# Patient Record
Sex: FEMALE | Race: WHITE | NOT HISPANIC OR LATINO | URBAN - METROPOLITAN AREA
[De-identification: names, ages, dates, MRNs, and addresses within clinical notes are randomized per-mention and may not be internally consistent; named-entity substitution may affect disease eponyms.]

---

## 2022-05-15 ENCOUNTER — INPATIENT (INPATIENT)
Facility: HOSPITAL | Age: 27
LOS: 2 days | Discharge: HOME | DRG: 386 | End: 2022-05-18
Attending: STUDENT IN AN ORGANIZED HEALTH CARE EDUCATION/TRAINING PROGRAM | Admitting: STUDENT IN AN ORGANIZED HEALTH CARE EDUCATION/TRAINING PROGRAM
Payer: COMMERCIAL

## 2022-05-15 VITALS
OXYGEN SATURATION: 99 % | SYSTOLIC BLOOD PRESSURE: 127 MMHG | HEART RATE: 132 BPM | WEIGHT: 119.05 LBS | DIASTOLIC BLOOD PRESSURE: 83 MMHG | RESPIRATION RATE: 19 BRPM | TEMPERATURE: 100 F

## 2022-05-15 LAB
ALBUMIN SERPL ELPH-MCNC: 3.9 G/DL — SIGNIFICANT CHANGE UP (ref 3.4–5)
ALP SERPL-CCNC: 67 U/L — SIGNIFICANT CHANGE UP (ref 40–120)
ALT FLD-CCNC: 9 U/L — LOW (ref 12–42)
ANION GAP SERPL CALC-SCNC: 11 MMOL/L — SIGNIFICANT CHANGE UP (ref 9–16)
APTT BLD: 33.3 SEC — SIGNIFICANT CHANGE UP (ref 27.5–35.5)
AST SERPL-CCNC: 5 U/L — LOW (ref 15–37)
BASOPHILS # BLD AUTO: 0.08 K/UL — SIGNIFICANT CHANGE UP (ref 0–0.2)
BASOPHILS NFR BLD AUTO: 0.7 % — SIGNIFICANT CHANGE UP (ref 0–2)
BILIRUB SERPL-MCNC: 0.7 MG/DL — SIGNIFICANT CHANGE UP (ref 0.2–1.2)
BUN SERPL-MCNC: 3 MG/DL — LOW (ref 7–23)
CALCIUM SERPL-MCNC: 9.4 MG/DL — SIGNIFICANT CHANGE UP (ref 8.5–10.5)
CHLORIDE SERPL-SCNC: 100 MMOL/L — SIGNIFICANT CHANGE UP (ref 96–108)
CO2 SERPL-SCNC: 25 MMOL/L — SIGNIFICANT CHANGE UP (ref 22–31)
CREAT SERPL-MCNC: 0.85 MG/DL — SIGNIFICANT CHANGE UP (ref 0.5–1.3)
EGFR: 96 ML/MIN/1.73M2 — SIGNIFICANT CHANGE UP
EOSINOPHIL # BLD AUTO: 0.36 K/UL — SIGNIFICANT CHANGE UP (ref 0–0.5)
EOSINOPHIL NFR BLD AUTO: 3.2 % — SIGNIFICANT CHANGE UP (ref 0–6)
GLUCOSE SERPL-MCNC: 80 MG/DL — SIGNIFICANT CHANGE UP (ref 70–99)
HCG SERPL-ACNC: <1 MIU/ML — SIGNIFICANT CHANGE UP
HCT VFR BLD CALC: 38.6 % — SIGNIFICANT CHANGE UP (ref 34.5–45)
HGB BLD-MCNC: 13.8 G/DL — SIGNIFICANT CHANGE UP (ref 11.5–15.5)
IMM GRANULOCYTES NFR BLD AUTO: 0.5 % — SIGNIFICANT CHANGE UP (ref 0–1.5)
INR BLD: 1.06 — SIGNIFICANT CHANGE UP (ref 0.88–1.16)
LACTATE SERPL-SCNC: 0.7 MMOL/L — SIGNIFICANT CHANGE UP (ref 0.4–2)
LIDOCAIN IGE QN: 71 U/L — LOW (ref 73–393)
LYMPHOCYTES # BLD AUTO: 1.98 K/UL — SIGNIFICANT CHANGE UP (ref 1–3.3)
LYMPHOCYTES # BLD AUTO: 17.4 % — SIGNIFICANT CHANGE UP (ref 13–44)
MCHC RBC-ENTMCNC: 33.7 PG — SIGNIFICANT CHANGE UP (ref 27–34)
MCHC RBC-ENTMCNC: 35.8 GM/DL — SIGNIFICANT CHANGE UP (ref 32–36)
MCV RBC AUTO: 94.4 FL — SIGNIFICANT CHANGE UP (ref 80–100)
MONOCYTES # BLD AUTO: 0.99 K/UL — HIGH (ref 0–0.9)
MONOCYTES NFR BLD AUTO: 8.7 % — SIGNIFICANT CHANGE UP (ref 2–14)
NEUTROPHILS # BLD AUTO: 7.89 K/UL — HIGH (ref 1.8–7.4)
NEUTROPHILS NFR BLD AUTO: 69.5 % — SIGNIFICANT CHANGE UP (ref 43–77)
NRBC # BLD: 0 /100 WBCS — SIGNIFICANT CHANGE UP (ref 0–0)
PLATELET # BLD AUTO: 227 K/UL — SIGNIFICANT CHANGE UP (ref 150–400)
POTASSIUM SERPL-MCNC: 3.2 MMOL/L — LOW (ref 3.5–5.3)
POTASSIUM SERPL-SCNC: 3.2 MMOL/L — LOW (ref 3.5–5.3)
PROT SERPL-MCNC: 7.5 G/DL — SIGNIFICANT CHANGE UP (ref 6.4–8.2)
PROTHROM AB SERPL-ACNC: 12.3 SEC — SIGNIFICANT CHANGE UP (ref 10.5–13.4)
RBC # BLD: 4.09 M/UL — SIGNIFICANT CHANGE UP (ref 3.8–5.2)
RBC # FLD: 11.5 % — SIGNIFICANT CHANGE UP (ref 10.3–14.5)
SARS-COV-2 RNA SPEC QL NAA+PROBE: SIGNIFICANT CHANGE UP
SODIUM SERPL-SCNC: 136 MMOL/L — SIGNIFICANT CHANGE UP (ref 132–145)
WBC # BLD: 11.36 K/UL — HIGH (ref 3.8–10.5)
WBC # FLD AUTO: 11.36 K/UL — HIGH (ref 3.8–10.5)

## 2022-05-15 PROCEDURE — 74177 CT ABD & PELVIS W/CONTRAST: CPT | Mod: 26

## 2022-05-15 PROCEDURE — 99223 1ST HOSP IP/OBS HIGH 75: CPT | Mod: GC

## 2022-05-15 PROCEDURE — 99285 EMERGENCY DEPT VISIT HI MDM: CPT

## 2022-05-15 RX ORDER — DIATRIZOATE MEGLUMINE 180 MG/ML
30 INJECTION, SOLUTION INTRAVESICAL ONCE
Refills: 0 | Status: COMPLETED | OUTPATIENT
Start: 2022-05-15 | End: 2022-05-15

## 2022-05-15 RX ORDER — ACETAMINOPHEN 500 MG
975 TABLET ORAL ONCE
Refills: 0 | Status: COMPLETED | OUTPATIENT
Start: 2022-05-15 | End: 2022-05-15

## 2022-05-15 RX ORDER — POTASSIUM CHLORIDE 20 MEQ
40 PACKET (EA) ORAL ONCE
Refills: 0 | Status: COMPLETED | OUTPATIENT
Start: 2022-05-15 | End: 2022-05-15

## 2022-05-15 RX ORDER — METRONIDAZOLE 500 MG
500 TABLET ORAL ONCE
Refills: 0 | Status: COMPLETED | OUTPATIENT
Start: 2022-05-15 | End: 2022-05-15

## 2022-05-15 RX ORDER — ONDANSETRON 8 MG/1
4 TABLET, FILM COATED ORAL ONCE
Refills: 0 | Status: COMPLETED | OUTPATIENT
Start: 2022-05-15 | End: 2022-05-15

## 2022-05-15 RX ORDER — SODIUM CHLORIDE 9 MG/ML
1650 INJECTION INTRAMUSCULAR; INTRAVENOUS; SUBCUTANEOUS ONCE
Refills: 0 | Status: COMPLETED | OUTPATIENT
Start: 2022-05-15 | End: 2022-05-15

## 2022-05-15 RX ORDER — CEFTRIAXONE 500 MG/1
1000 INJECTION, POWDER, FOR SOLUTION INTRAMUSCULAR; INTRAVENOUS ONCE
Refills: 0 | Status: COMPLETED | OUTPATIENT
Start: 2022-05-15 | End: 2022-05-15

## 2022-05-15 RX ORDER — SODIUM CHLORIDE 9 MG/ML
1000 INJECTION INTRAMUSCULAR; INTRAVENOUS; SUBCUTANEOUS ONCE
Refills: 0 | Status: COMPLETED | OUTPATIENT
Start: 2022-05-15 | End: 2022-05-15

## 2022-05-15 RX ORDER — MORPHINE SULFATE 50 MG/1
2 CAPSULE, EXTENDED RELEASE ORAL ONCE
Refills: 0 | Status: DISCONTINUED | OUTPATIENT
Start: 2022-05-15 | End: 2022-05-15

## 2022-05-15 RX ADMIN — SODIUM CHLORIDE 1000 MILLILITER(S): 9 INJECTION INTRAMUSCULAR; INTRAVENOUS; SUBCUTANEOUS at 23:15

## 2022-05-15 RX ADMIN — Medication 975 MILLIGRAM(S): at 13:52

## 2022-05-15 RX ADMIN — CEFTRIAXONE 100 MILLIGRAM(S): 500 INJECTION, POWDER, FOR SOLUTION INTRAMUSCULAR; INTRAVENOUS at 13:52

## 2022-05-15 RX ADMIN — DIATRIZOATE MEGLUMINE 30 MILLILITER(S): 180 INJECTION, SOLUTION INTRAVESICAL at 13:52

## 2022-05-15 RX ADMIN — MORPHINE SULFATE 2 MILLIGRAM(S): 50 CAPSULE, EXTENDED RELEASE ORAL at 23:00

## 2022-05-15 RX ADMIN — Medication 975 MILLIGRAM(S): at 23:00

## 2022-05-15 RX ADMIN — Medication 100 MILLIGRAM(S): at 17:35

## 2022-05-15 RX ADMIN — SODIUM CHLORIDE 1650 MILLILITER(S): 9 INJECTION INTRAMUSCULAR; INTRAVENOUS; SUBCUTANEOUS at 13:52

## 2022-05-15 RX ADMIN — SODIUM CHLORIDE 1000 MILLILITER(S): 9 INJECTION INTRAMUSCULAR; INTRAVENOUS; SUBCUTANEOUS at 21:11

## 2022-05-15 RX ADMIN — MORPHINE SULFATE 2 MILLIGRAM(S): 50 CAPSULE, EXTENDED RELEASE ORAL at 21:05

## 2022-05-15 RX ADMIN — Medication 40 MILLIEQUIVALENT(S): at 14:43

## 2022-05-15 NOTE — ED ADULT NURSE REASSESSMENT NOTE - NS ED NURSE REASSESS COMMENT FT1
pt has now agreed to acetaminophen, broad-spectrum antibiotics, and oral contrast. still does not want covid swab.

## 2022-05-15 NOTE — H&P ADULT - HISTORY OF PRESENT ILLNESS
Patient is a 25 yo F, PMH of ulcerative colitis (not on meds for 10+ years, follows with gastroenterologist Dr. Talbot at [place]), presenting w/ one week of bloody diarrhea (7-10 episodes per day) and generalized abdomina pain. She also reports a fever earlier today. Her last colonoscopy was ~10 years ago. Denies chills, vomiting, URI symptoms.    ED Course:  ED Vitals: T 100.4 F, /83, , RR 19, 99% O2 on RA   ED Labs: WBC 11.36, Hgb 13.8, K 3.2, lactate 0.7, lipase 71   COVID negative   EKG: sinus tachycardia   CTAP: Pancolitis likely of inflammatory etiology.    GI consulted in [facility]. Recommended testing for GI PCR and c. diff, hep B/C screening, QuantiFeron, ESR, CRP, NPO past midnight and possible flex sigmoidoscopy tomorrow.      In the ED, patient was given Zofran 4 mg, Tylenol, Ceftriaxone 1 g, Flagyl 500 mg, Morphine 2 mg, potassium chloride 40 mg, and 1 L NS    Patient is a 27 yo F, PMH of ulcerative colitis (not on meds since 2011, follows with gastroenterologist Dr. Nick at [place]), presenting w/ one week of worsening bloody diarrhea (6-10 episodes per day), generalized abdominal pain, nausea, and one fever and chills yesterday. Patient has a history of UC (diagnosed when she was 7 years old) and was told she had pancolitis. Was treated w/ several medications in the past, the last time was in 2011. Patient reports she stopped taking medications at that time because she was feeling better. She cannot recall the last time she had a colonoscopy but thinks around 10 years ago. This past December 2011, she went to see Dr. Nick at [facility] because she felt that she was having more frequent bowel movements. Dr. Nick recommended repeat colonoscopy which the patient has not yet completed. She reports her symptoms started worsening ~2 months ago, and then significantly in the last week. Reports frequent bloody diarrhea bowel movements (typically around 6 per day), generalized abdominal pain but worst in lower quadrants, lower back pain, pain on defecation, and nausea. Reports she has not eaten much in the last week. Also felt warm w/ chills at home yesterday but did not measure her temperature. Patient reports she ate a whole pie of [employer] pizza around 1 week ago and felt that her symptoms were triggered shortly after that. She denies recent travel, sick contacts, chest pain, SOB, urinary symptoms, light headedness, or dizziness. She is currently finishing her menstrual cycle.    Of note, patient has a family history of colon and stomach cancer in her grandparents and Crohn's disease in her older sister. She also had C. diff when she was 7 years old and reports she had Norovirus in December 2021.    ED Course:  ED Vitals: T 100.4 F, /83, , RR 19, 99% O2 on RA   ED Labs: WBC 11.36, Hgb 13.8, K 3.2, lactate 0.7, lipase 71   COVID negative   EKG: sinus tachycardia   CTAP: Pancolitis likely of inflammatory etiology.    GI consulted at [facility]. Recommended testing for GI PCR and C. diff, hep B/C screening, QuantiFeron, ESR, CRP, NPO past midnight and possible flex sigmoidoscopy tomorrow.      In the ED, patient was given Zofran 4 mg, Tylenol, Ceftriaxone 1 g, Flagyl 500 mg, Morphine 2 mg, potassium chloride 40 mg, and 2.6 L NS    Patient is a 25 yo F, PMH of ulcerative colitis (not on meds since 2011, follows with gastroenterologist Dr. Nick at [place]), presenting w/ one week of worsening bloody diarrhea (6-10 episodes per day), generalized abdominal pain, nausea, and one fever and chills yesterday. Patient has a history of UC (diagnosed when she was 7 years old) and was told she had pancolitis. Was treated w/ several medications in the past, the last time was in 2011. Patient reports she stopped taking medications at that time because she was feeling better. She cannot recall the last time she had a colonoscopy but thinks around 10 years ago. This past December 2011, she went to see Dr. Nick at [place] because she felt that she was having more frequent bowel movements. Dr. Nick recommended repeat colonoscopy which the patient has not yet completed. She reports her symptoms started worsening ~2 months ago, and then significantly in the last week. Reports frequent bloody diarrhea bowel movements (typically around 6 per day), generalized abdominal pain but worst in lower quadrants, lower back pain, pain on defecation, and nausea. Reports she has not eaten much in the last week. Also felt warm w/ chills at home yesterday but did not measure her temperature. Patient reports she ate a whole pie of [employer] pizza around 1 week ago and felt that her symptoms were triggered shortly after that. She denies recent travel, sick contacts, chest pain, SOB, urinary symptoms, light headedness, or dizziness. She is currently finishing her menstrual cycle.    Of note, patient has a family history of colon and stomach cancer in her grandparents and Crohn's disease in her older sister. She also had C. diff when she was 7 years old and reports she had Norovirus in December 2021.    ED Course:  ED Vitals: T 100.4 F, /83, , RR 19, 99% O2 on RA   ED Labs: WBC 11.36, Hgb 13.8, K 3.2, lactate 0.7, lipase 71. HCG <1.  COVID negative   EKG: sinus tachycardia   CTAP: Pancolitis likely of inflammatory etiology.    GI consulted at [facility]. Recommended testing for GI PCR and C. diff, hep B/C screening, QuantiFeron, ESR, CRP, NPO past midnight and possible flex sigmoidoscopy tomorrow.      In the ED, patient was given Zofran 4 mg, Tylenol, Ceftriaxone 1 g, Flagyl 500 mg, Morphine 2 mg, potassium chloride 40 mg, and 2.6 L NS    Patient is a 27 yo F, PMH of ulcerative colitis (not on meds since 2011, follows with gastroenterologist Dr. Nick at [place]), presenting w/ one week of worsening bloody diarrhea (6-10 episodes per day), generalized abdominal pain, nausea, and one fever and chills yesterday. Patient has a history of UC (diagnosed when she was 7 years old) and was told she had pancolitis. Was treated w/ several medications in the past, the last time was in 2011. Patient reports she stopped taking medications at that time because she was feeling better. She cannot recall the last time she had a colonoscopy but thinks around 10 years ago. This past December 2021, she went to see Dr. Nick at [place] because she felt that she was having more frequent bowel movements. Dr. Nick recommended repeat colonoscopy which the patient has not yet completed. She reports her symptoms started worsening ~2 months ago, and then significantly in the last week. Reports frequent bloody diarrhea bowel movements (typically around 6 per day), generalized abdominal pain but worst in lower quadrants, lower back pain, pain on defecation, and nausea. Reports she has not eaten much in the last week. Also felt warm w/ chills at home yesterday but did not measure her temperature. Patient reports she ate a whole pie of [employer] pizza around 1 week ago and felt that her symptoms were triggered shortly after that. She denies recent travel, sick contacts, chest pain, SOB, urinary symptoms, light headedness, or dizziness. She is currently finishing her menstrual cycle.    Of note, patient has a family history of colon and stomach cancer in her grandparents and Crohn's disease in her older sister. She also had C. diff when she was 7 years old and reports she had Norovirus in December 2021.    ED Course:  ED Vitals: T 100.4 F, /83, , RR 19, 99% O2 on RA   ED Labs: WBC 11.36, Hgb 13.8, K 3.2, lactate 0.7, lipase 71. HCG <1.  COVID negative   UA: positive for ketones  EKG: sinus tachycardia   CTAP: Pancolitis likely of inflammatory etiology.    GI consulted at [facility]. Recommended testing for GI PCR and C. diff, hep B/C screening, QuantiFeron, ESR, CRP, NPO past midnight and possible flex sigmoidoscopy tomorrow.      In the ED, patient was given Zofran 4 mg, Tylenol, Ceftriaxone 1 g, Flagyl 500 mg, Morphine 2 mg, potassium chloride 40 mg, and 2.6 L NS

## 2022-05-15 NOTE — H&P ADULT - PROBLEM SELECTOR PLAN 3
- Plan as above for UC and bloody diarrhea   - Patient received Morphine in the ED for pain   - Tylenol PRN for abdominal pain Patient has a history of ulcerative colitis diagnosed when she was 7 years old. Reports she has been on medications in the past, last time in 2011. She cannot recall the last time she had a colonoscopy.  - GI consulted in [facility] - f/u recs in the AM   - Per GI, hold off on steroids for now   - Plan for possible flex sigmoidoscopy tomorrow   - F/u results of labs and stool studies as above

## 2022-05-15 NOTE — H&P ADULT - PROBLEM SELECTOR PLAN 1
Patient presenting w/ bloody diarrhea, diffuse abdominal pain, nausea, fever and chills for one week. Last episode of bloody diarrhea was morning of admission - she reports she has had normal colored loose stool since then.   CTAP on admission showed pancolitis likely inflammatory in nature. Symptoms are likely 2/2 UC flare, but cannot rule out infectious process as well.  - S/p Ceftriaxone and Flagyl in the ED   - As suspicion for inflammatory process is high, will hold off on continuing abx for now pending GI PCR, C.diff and flex sig results   - GI consulted at [facility] - f/u recs in the AM   - Plan for possible flex sigmoidoscopy in the AM (NPO after midnight)   - F/u C. diff and GI PCR   - F/u ESR and CRP in the AM   - F/u hepatitis screening   - Plan as below On admission met 2/4 SIRS criteria with [facility] and tachycardic with HR in the 100s on admission. Likely source is colitis seen on imaging.  - Lactate negative 0.7.  - Patient given Ceftriaxone and Flagyl in the ED   - F/u blood cxs, UA/Ucx  - S/p 2.6 L fluids in the ED  - Vitals improved   - Plan as below

## 2022-05-15 NOTE — H&P ADULT - ATTENDING COMMENTS
#Sepsis: hx of UC; meeting SIRS in setting of bloody diarhea, fever 101, mild leukocytosis. CTAP +pancolitis. NO rebound on exam, HDS. SIRS likely 2/2 UC flare vs less likely infectious colitis. F/up cdiff, GI pcr, esr/crp. GI consulted- f/up infectious work up; plan for flex sig, NPO.

## 2022-05-15 NOTE — H&P ADULT - PROBLEM SELECTOR PLAN 7
F: s/p 2.6 L NS in the ED. LR 40 cc/hr overnight   E: replete as needed   N: NPO for now pending GI recs in the morning   DVT prophylaxis: holding prior to procedre tomorrow - f/u GI recs after flex sig as patient should be on AC for inflammatory colitis  GI prophylaxis: none needed     Dispo: [facility]

## 2022-05-15 NOTE — H&P ADULT - NSHPSOCIALHISTORY_GEN_ALL_CORE
Patient lives in [place], she is interning in film production. Is independent. She reports she smokes ~1 cigarette weekly for the last 10 years, daily marijuana use for the last 10 years, denies alcohol use.

## 2022-05-15 NOTE — H&P ADULT - PROBLEM SELECTOR PLAN 6
F: s/p 2.6 L NS in the ED   E: replete as needed   N: NPO for now pending GI recs in the morning   DVT prophylaxis: none needed (IMPROVE score 0)   GI prophylaxis: none needed     Dispo: [facility] WBC 11.36 on admission w/ elevated neutrophils. Likely elevated in the setting of pancolitis.  - Plan as above   - No other signs or symptoms of other infection besides colitis   - F/u stool studies, blood cultures

## 2022-05-15 NOTE — ED PROVIDER NOTE - PHYSICAL EXAMINATION
CONSTITUTIONAL: Well-appearing; well-nourished; in no apparent distress.   	HEAD: Normocephalic; atraumatic.   	EYES:  conjunctiva and sclera clear  	ENT: normal nose; no rhinorrhea; normal pharynx with no erythema or lesions.   	NECK: Supple; non-tender;   	CARDIOVASCULAR: Normal S1, S2; no murmurs, rubs, or gallops. Regular rate and rhythm.   	RESPIRATORY: Breathing easily; breath sounds clear and equal bilaterally; no wheezes, rhonchi, or rales.  	GI: Soft; non-distended; generalized abdominal tenderness.   	EXT: CHAIDEZ x 4. ambulatory.   	SKIN: Normal for age and race; warm; dry; good turgor; no apparent lesions or rash.   	NEURO: A & O x 3; face symmetric; grossly unremarkable.   PSYCHOLOGICAL: The patient’s mood and manner are appropriate.

## 2022-05-15 NOTE — ED ADULT NURSE NOTE - NSIMPLEMENTINTERV_GEN_ALL_ED
Implemented All Universal Safety Interventions:  [place] to call system. Call bell, personal items and telephone within reach. Instruct patient to call for assistance. Room bathroom lighting operational. Non-slip footwear when patient is off stretcher. Physically safe environment: no spills, clutter or unnecessary equipment. Stretcher in lowest position, wheels locked, appropriate side rails in place.

## 2022-05-15 NOTE — H&P ADULT - PROBLEM SELECTOR PLAN 5
WBC 11.36 on admission w/ elevated neutrophils. Likely elevated in the setting of pancolitis.  - Plan as above   - No other signs or symptoms of other infection besides colitis   - F/u stool studies, blood cultures - C/w Zofran PRN for nausea   - QTC on admission 433 - continue to monitor

## 2022-05-15 NOTE — H&P ADULT - NSICDXFAMILYHX_GEN_ALL_CORE_FT
FAMILY HISTORY:  Sibling  Still living? Unknown  FH: Crohn's disease, Age at diagnosis: Age Unknown    Grandparent  Still living? Unknown  FH: colon cancer, Age at diagnosis: Age Unknown

## 2022-05-15 NOTE — ED PROVIDER NOTE - OBJECTIVE STATEMENT
25 yo female with hx ulcerative colitis not on meds, follows with GI Dr. Talbot at [place], presents c/o bloody diarrhea (about 7-10 episodes daily) x 1 week with generalized abdominal pain. +fever today. no vomiting. no URI symptoms. not vaccinated for covid-19. 25 yo female with hx ulcerative colitis not on meds, follows with GI Dr. Talbot at [place], presents c/o bloody diarrhea (about 7-10 episodes daily) x 1 week with generalized abdominal pain. +fever today. no vomiting. no URI symptoms. not vaccinated for covid-19. last colonoscopy about 10 years ago 27 yo female with hx ulcerative colitis not on meds for 10+ years, follows with GI Dr. Talbot at [place], presents c/o bloody diarrhea (about 7-10 episodes daily) x 1 week with generalized abdominal pain. +fever today. no vomiting. no URI symptoms. not vaccinated for covid-19. last colonoscopy about 10 years ago

## 2022-05-15 NOTE — H&P ADULT - PROBLEM SELECTOR PLAN 4
- C/w Zofran PRN for nausea   - QTC on admission 433 - continue to monitor - Plan as above for UC and bloody diarrhea   - Patient received Morphine in the ED for pain   - Tylenol PRN for abdominal pain

## 2022-05-15 NOTE — ED PROVIDER NOTE - CLINICAL SUMMARY MEDICAL DECISION MAKING FREE TEXT BOX
young female hx ulcerative colitis, not on meds for 10+ years, presents with fever, bloody diarrhea and generalized abdominal pain x 1 week, found to have pancolitis, wbc 11k, normal lactate, meets sepsis criteria, covered with IV ceftriaxone/flagyl. gi pcr ordered. d/w Dr. Zak PINK, he recommended to hold off on steroids at this point pending GI PCR and C diff studies. JOESPH also recommended hep b, hep C screening, and quantiferon (will defer to inpatient team to order this as these are send out tests for [facility]). d/w Dr. Mcintyre hospitalist, admit to [facility] medicine bed.

## 2022-05-15 NOTE — H&P ADULT - PROBLEM SELECTOR PLAN 2
Patient has a history of ulcerative colitis diagnosed when she was 7 years old. Reports she has been on medications in the past, last time in 2011. She cannot recall the last time she had a colonoscopy.  - GI consulted in [facility] - f/u recs in the AM   - Per GI, hold off on steroids for now   - Plan for possible flex sigmoidoscopy tomorrow   - F/u results of labs and stool studies as above Patient presenting w/ bloody diarrhea, diffuse abdominal pain, nausea, fever and chills for one week. Last episode of bloody diarrhea was morning of admission - she reports she has had normal colored loose stool since then.   CTAP on admission showed pancolitis likely inflammatory in nature. Symptoms are likely 2/2 UC flare, but cannot rule out infectious process as well.  - S/p Ceftriaxone and Flagyl in the ED   - As suspicion for inflammatory process is high, will hold off on continuing abx for now pending GI PCR, C.diff and flex sig results   - GI consulted at [facility] - f/u recs in the AM   - Plan for possible flex sigmoidoscopy in the AM (NPO after midnight)   - F/u C. diff and GI PCR   - F/u ESR and CRP in the AM   - F/u hepatitis screening   - Plan as below

## 2022-05-15 NOTE — H&P ADULT - NSHPLABSRESULTS_GEN_ALL_CORE
.  LABS:                         13.8   11.36 )-----------( 227      ( 15 May 2022 13:48 )             38.6     05-15    136  |  100  |  3<L>  ----------------------------<  80  3.2<L>   |  25  |  0.85    Ca    9.4      15 May 2022 13:48    TPro  7.5  /  Alb  3.9  /  TBili  0.7  /  DBili  x   /  AST  5<L>  /  ALT  9<L>  /  AlkPhos  67  05-15    PT/INR - ( 15 May 2022 13:48 )   PT: 12.3 sec;   INR: 1.06          PTT - ( 15 May 2022 13:48 )  PTT:33.3 sec          Lactate, Blood: 0.7 mmoL/L (05-15 @ 13:51)      RADIOLOGY, EKG & ADDITIONAL TESTS: Reviewed.   < from: CT Abdomen and Pelvis w/ Oral Cont and w/ IV Cont (05.15.22 @ 16:09) >      FINDINGS: Images of the lower chest demonstrate no abnormality    The liver is normal in in size. Probable 1.2 cm hemangioma in segment   seven of liver..  A few other subcentimeter left and right lobe liver   hypodensities are too small to characterize.    No radiopaque stones are seen in the gallbladder.    The pancreas is normal in appearance.     No splenic abnormalities are seen.    The adrenal glands are unremarkable.     The kidneys are normal in appearance.    No abdominal aortic aneurysm is seen.    No lymphadenopathy is seen.    Evaluation of the bowel demonstrates that the ingested oral contrast   material has reached the rectum. No bowel obstruction. There is evidence   of continuous submucosal edema and mucosal enhancement of the entire   colon. Possible involvement of the rectum.. Normal appendix.     No ascites is seen.    Images of the pelvis demonstrate the uterus and ovaries/adnexae to be   normal in appearance. Unremarkable bladder.    Evaluation of the osseous structures demonstrates no significant   abnormality.      IMPRESSION: Pancolitis likely of inflammatory etiology.    < end of copied text >

## 2022-05-15 NOTE — H&P ADULT - NSHPPHYSICALEXAM_GEN_ALL_CORE
.  VITAL SIGNS:  T(F): 100.4 (05-16-22 @ 00:05), Max: 101.6 (05-15-22 @ 21:06)  HR: 99 (05-16-22 @ 00:05) (96 - 132)  BP: 98/62 (05-16-22 @ 00:05) (98/62 - 127/83)  BP(mean): --  RR: 19 (05-16-22 @ 00:05) (16 - 20)  SpO2: 98% (05-16-22 @ 00:05) (96% - 99%)    PHYSICAL EXAM:    Constitutional: young female resting in bed, in NAD.  HEENT: NC/AT, PERRL, EOMI, anicteric sclera, no nasal discharge; uvula midline, no oropharyngeal erythema or exudates; MMM  Neck: supple  Respiratory: CTA B/L; no W/R/R, no retractions  Cardiac: +S1/S2; regular rhythm, tachycardic; no M/R/G  Gastrointestinal: soft, diffuse tenderness in the lower quadrants, no rebound or guarding, +BS in all 4 quadrants  Back: spine midline, mild tenderness in lower back  Extremities: WWP, no clubbing or cyanosis; no peripheral edema  Musculoskeletal: NROM x4; no joint swelling, tenderness or erythema  Vascular: 2+ radial, DP/PT pulses B/L  Dermatologic: skin warm, dry and intact; no rashes, wounds, or scars  Neurologic: AAOx3; CNII-XII grossly intact; no focal deficits  Psychiatric: affect and characteristics of appearance, verbalizations, behaviors are appropriate

## 2022-05-15 NOTE — ED ADULT NURSE REASSESSMENT NOTE - NS ED NURSE REASSESS COMMENT FT1
Transfer of care acknowledged with bedside rounding, lab results reviewed, will continue to monitor.  pt in nad, resting comfortably in stretcher  awaiting admission to [facility]

## 2022-05-15 NOTE — ED ADULT NURSE REASSESSMENT NOTE - NS ED NURSE REASSESS COMMENT FT1
provider and this RN assessing pt. pt states she does not want any NSAIDS even if it is for a fever, also does not want antibiotics, does not want a covid swab. when this RN going to insert IV line and obtain blood work, pt specifically asking for rn to place tourniquet over her sweater and not on her skin. provider is aware.

## 2022-05-15 NOTE — ED ADULT TRIAGE NOTE - CHIEF COMPLAINT QUOTE
here for abdominal pain, diarrhea, rectal bleeding- h/o Ulcerative Colitis - pt is febrile and tachy in triage- is unvaccinated for covid

## 2022-05-15 NOTE — H&P ADULT - ASSESSMENT
Patient is a 27 yo F, PMH of ulcerative colitis (not on meds since 2011), presenting w/ one week of worsening bloody diarrhea (6-10 episodes per day), generalized abdominal pain, nausea, and one fever and chills, found to have inflammatory pancolitis on CTAP, and admitted for further work-up with GI.

## 2022-05-16 ENCOUNTER — RESULT REVIEW (OUTPATIENT)
Age: 27
End: 2022-05-16

## 2022-05-16 ENCOUNTER — TRANSCRIPTION ENCOUNTER (OUTPATIENT)
Age: 27
End: 2022-05-16

## 2022-05-16 DIAGNOSIS — D72.829 ELEVATED WHITE BLOOD CELL COUNT, UNSPECIFIED: ICD-10-CM

## 2022-05-16 DIAGNOSIS — R11.0 NAUSEA: ICD-10-CM

## 2022-05-16 DIAGNOSIS — K51.90 ULCERATIVE COLITIS, UNSPECIFIED, WITHOUT COMPLICATIONS: ICD-10-CM

## 2022-05-16 DIAGNOSIS — R10.9 UNSPECIFIED ABDOMINAL PAIN: ICD-10-CM

## 2022-05-16 DIAGNOSIS — A41.9 SEPSIS, UNSPECIFIED ORGANISM: ICD-10-CM

## 2022-05-16 DIAGNOSIS — R19.7 DIARRHEA, UNSPECIFIED: ICD-10-CM

## 2022-05-16 DIAGNOSIS — R63.8 OTHER SYMPTOMS AND SIGNS CONCERNING FOOD AND FLUID INTAKE: ICD-10-CM

## 2022-05-16 LAB
ALBUMIN SERPL ELPH-MCNC: 3.6 G/DL — SIGNIFICANT CHANGE UP (ref 3.3–5)
ALP SERPL-CCNC: 54 U/L — SIGNIFICANT CHANGE UP (ref 40–120)
ALT FLD-CCNC: 10 U/L — SIGNIFICANT CHANGE UP (ref 10–45)
ANION GAP SERPL CALC-SCNC: 11 MMOL/L — SIGNIFICANT CHANGE UP (ref 5–17)
APPEARANCE UR: CLEAR — SIGNIFICANT CHANGE UP
APTT BLD: 32.5 SEC — SIGNIFICANT CHANGE UP (ref 27.5–35.5)
AST SERPL-CCNC: 13 U/L — SIGNIFICANT CHANGE UP (ref 10–40)
BACTERIA # UR AUTO: PRESENT /HPF
BASOPHILS # BLD AUTO: 0.04 K/UL — SIGNIFICANT CHANGE UP (ref 0–0.2)
BASOPHILS NFR BLD AUTO: 0.5 % — SIGNIFICANT CHANGE UP (ref 0–2)
BILIRUB SERPL-MCNC: 0.7 MG/DL — SIGNIFICANT CHANGE UP (ref 0.2–1.2)
BILIRUB UR-MCNC: NEGATIVE — SIGNIFICANT CHANGE UP
BLD GP AB SCN SERPL QL: NEGATIVE — SIGNIFICANT CHANGE UP
BUN SERPL-MCNC: 2 MG/DL — LOW (ref 7–23)
C DIFF GDH STL QL: NEGATIVE — SIGNIFICANT CHANGE UP
C DIFF GDH STL QL: SIGNIFICANT CHANGE UP
CALCIUM SERPL-MCNC: 8.8 MG/DL — SIGNIFICANT CHANGE UP (ref 8.4–10.5)
CHLORIDE SERPL-SCNC: 103 MMOL/L — SIGNIFICANT CHANGE UP (ref 96–108)
CO2 SERPL-SCNC: 23 MMOL/L — SIGNIFICANT CHANGE UP (ref 22–31)
COLOR SPEC: YELLOW — SIGNIFICANT CHANGE UP
CREAT SERPL-MCNC: 0.72 MG/DL — SIGNIFICANT CHANGE UP (ref 0.5–1.3)
CRP SERPL-MCNC: 62.1 MG/L — HIGH (ref 0–4)
CULTURE RESULTS: SIGNIFICANT CHANGE UP
DIFF PNL FLD: ABNORMAL
EGFR: 117 ML/MIN/1.73M2 — SIGNIFICANT CHANGE UP
EOSINOPHIL # BLD AUTO: 0.39 K/UL — SIGNIFICANT CHANGE UP (ref 0–0.5)
EOSINOPHIL NFR BLD AUTO: 4.7 % — SIGNIFICANT CHANGE UP (ref 0–6)
EPI CELLS # UR: SIGNIFICANT CHANGE UP /HPF (ref 0–5)
ERYTHROCYTE [SEDIMENTATION RATE] IN BLOOD: 19 MM/HR — HIGH
GLUCOSE SERPL-MCNC: 69 MG/DL — LOW (ref 70–99)
GLUCOSE UR QL: NEGATIVE — SIGNIFICANT CHANGE UP
HAV IGM SER-ACNC: SIGNIFICANT CHANGE UP
HBV CORE IGM SER-ACNC: SIGNIFICANT CHANGE UP
HBV SURFACE AG SER-ACNC: SIGNIFICANT CHANGE UP
HCT VFR BLD CALC: 33.9 % — LOW (ref 34.5–45)
HCV AB S/CO SERPL IA: 0.04 S/CO — SIGNIFICANT CHANGE UP
HCV AB SERPL-IMP: SIGNIFICANT CHANGE UP
HGB BLD-MCNC: 11.9 G/DL — SIGNIFICANT CHANGE UP (ref 11.5–15.5)
IMM GRANULOCYTES NFR BLD AUTO: 0.2 % — SIGNIFICANT CHANGE UP (ref 0–1.5)
INR BLD: 1.23 — HIGH (ref 0.88–1.16)
KETONES UR-MCNC: >=80 MG/DL
LEUKOCYTE ESTERASE UR-ACNC: NEGATIVE — SIGNIFICANT CHANGE UP
LYMPHOCYTES # BLD AUTO: 2.21 K/UL — SIGNIFICANT CHANGE UP (ref 1–3.3)
LYMPHOCYTES # BLD AUTO: 26.4 % — SIGNIFICANT CHANGE UP (ref 13–44)
MAGNESIUM SERPL-MCNC: 1.8 MG/DL — SIGNIFICANT CHANGE UP (ref 1.6–2.6)
MCHC RBC-ENTMCNC: 33.2 PG — SIGNIFICANT CHANGE UP (ref 27–34)
MCHC RBC-ENTMCNC: 35.1 GM/DL — SIGNIFICANT CHANGE UP (ref 32–36)
MCV RBC AUTO: 94.7 FL — SIGNIFICANT CHANGE UP (ref 80–100)
MONOCYTES # BLD AUTO: 0.75 K/UL — SIGNIFICANT CHANGE UP (ref 0–0.9)
MONOCYTES NFR BLD AUTO: 9 % — SIGNIFICANT CHANGE UP (ref 2–14)
NEUTROPHILS # BLD AUTO: 4.95 K/UL — SIGNIFICANT CHANGE UP (ref 1.8–7.4)
NEUTROPHILS NFR BLD AUTO: 59.2 % — SIGNIFICANT CHANGE UP (ref 43–77)
NITRITE UR-MCNC: NEGATIVE — SIGNIFICANT CHANGE UP
NRBC # BLD: 0 /100 WBCS — SIGNIFICANT CHANGE UP (ref 0–0)
PH UR: 5.5 — SIGNIFICANT CHANGE UP (ref 5–8)
PHOSPHATE SERPL-MCNC: 3.1 MG/DL — SIGNIFICANT CHANGE UP (ref 2.5–4.5)
PLATELET # BLD AUTO: 203 K/UL — SIGNIFICANT CHANGE UP (ref 150–400)
POTASSIUM SERPL-MCNC: 3.8 MMOL/L — SIGNIFICANT CHANGE UP (ref 3.5–5.3)
POTASSIUM SERPL-SCNC: 3.8 MMOL/L — SIGNIFICANT CHANGE UP (ref 3.5–5.3)
PROT SERPL-MCNC: 5.9 G/DL — LOW (ref 6–8.3)
PROT UR-MCNC: NEGATIVE MG/DL — SIGNIFICANT CHANGE UP
PROTHROM AB SERPL-ACNC: 14.7 SEC — HIGH (ref 10.5–13.4)
RBC # BLD: 3.58 M/UL — LOW (ref 3.8–5.2)
RBC # FLD: 11.7 % — SIGNIFICANT CHANGE UP (ref 10.3–14.5)
RBC CASTS # UR COMP ASSIST: < 5 /HPF — SIGNIFICANT CHANGE UP
RH IG SCN BLD-IMP: POSITIVE — SIGNIFICANT CHANGE UP
SODIUM SERPL-SCNC: 137 MMOL/L — SIGNIFICANT CHANGE UP (ref 135–145)
SP GR SPEC: 1.02 — SIGNIFICANT CHANGE UP (ref 1–1.03)
SPECIMEN SOURCE: SIGNIFICANT CHANGE UP
UROBILINOGEN FLD QL: 0.2 E.U./DL — SIGNIFICANT CHANGE UP
WBC # BLD: 8.36 K/UL — SIGNIFICANT CHANGE UP (ref 3.8–10.5)
WBC # FLD AUTO: 8.36 K/UL — SIGNIFICANT CHANGE UP (ref 3.8–10.5)
WBC UR QL: < 5 /HPF — SIGNIFICANT CHANGE UP

## 2022-05-16 PROCEDURE — 99233 SBSQ HOSP IP/OBS HIGH 50: CPT | Mod: GC

## 2022-05-16 PROCEDURE — 88305 TISSUE EXAM BY PATHOLOGIST: CPT | Mod: 26

## 2022-05-16 PROCEDURE — 45331 SIGMOIDOSCOPY AND BIOPSY: CPT

## 2022-05-16 PROCEDURE — 99223 1ST HOSP IP/OBS HIGH 75: CPT | Mod: 25

## 2022-05-16 RX ORDER — LANOLIN ALCOHOL/MO/W.PET/CERES
3 CREAM (GRAM) TOPICAL AT BEDTIME
Refills: 0 | Status: DISCONTINUED | OUTPATIENT
Start: 2022-05-16 | End: 2022-05-18

## 2022-05-16 RX ORDER — TRAZODONE HCL 50 MG
50 TABLET ORAL AT BEDTIME
Refills: 0 | Status: DISCONTINUED | OUTPATIENT
Start: 2022-05-16 | End: 2022-05-17

## 2022-05-16 RX ORDER — ENOXAPARIN SODIUM 100 MG/ML
40 INJECTION SUBCUTANEOUS EVERY 24 HOURS
Refills: 0 | Status: DISCONTINUED | OUTPATIENT
Start: 2022-05-16 | End: 2022-05-18

## 2022-05-16 RX ORDER — SODIUM CHLORIDE 9 MG/ML
1000 INJECTION, SOLUTION INTRAVENOUS
Refills: 0 | Status: DISCONTINUED | OUTPATIENT
Start: 2022-05-16 | End: 2022-05-18

## 2022-05-16 RX ORDER — ONDANSETRON 8 MG/1
4 TABLET, FILM COATED ORAL EVERY 8 HOURS
Refills: 0 | Status: DISCONTINUED | OUTPATIENT
Start: 2022-05-16 | End: 2022-05-18

## 2022-05-16 RX ORDER — POTASSIUM CHLORIDE 20 MEQ
40 PACKET (EA) ORAL ONCE
Refills: 0 | Status: COMPLETED | OUTPATIENT
Start: 2022-05-16 | End: 2022-05-16

## 2022-05-16 RX ORDER — ACETAMINOPHEN 500 MG
650 TABLET ORAL EVERY 6 HOURS
Refills: 0 | Status: DISCONTINUED | OUTPATIENT
Start: 2022-05-16 | End: 2022-05-18

## 2022-05-16 RX ORDER — SODIUM CHLORIDE 9 MG/ML
1000 INJECTION, SOLUTION INTRAVENOUS
Refills: 0 | Status: DISCONTINUED | OUTPATIENT
Start: 2022-05-16 | End: 2022-05-16

## 2022-05-16 RX ADMIN — SODIUM CHLORIDE 70 MILLILITER(S): 9 INJECTION, SOLUTION INTRAVENOUS at 09:54

## 2022-05-16 RX ADMIN — SODIUM CHLORIDE 40 MILLILITER(S): 9 INJECTION, SOLUTION INTRAVENOUS at 02:34

## 2022-05-16 RX ADMIN — SODIUM CHLORIDE 70 MILLILITER(S): 9 INJECTION, SOLUTION INTRAVENOUS at 13:56

## 2022-05-16 RX ADMIN — Medication 20 MILLIGRAM(S): at 13:57

## 2022-05-16 RX ADMIN — Medication 40 MILLIEQUIVALENT(S): at 01:34

## 2022-05-16 RX ADMIN — Medication 3 MILLIGRAM(S): at 21:17

## 2022-05-16 RX ADMIN — Medication 20 MILLIGRAM(S): at 21:18

## 2022-05-16 NOTE — CHART NOTE - NSCHARTNOTEFT_GEN_A_CORE
Case d/w [facility]:    Recs:  - Stool for c dfiff and GI PCR  - If negative possible flex sigmoidoscopy tomorrow (depending on endo schedule at [facility])  - Hep B/C screening and Quantiferon  - ESR, CRP  - Monitor CBC and CMP  - DVT px  - Clears for now as tolerated. NPO past midnight
Patient had a/an Flexible Sigmoidoscopy with Dr. Borrero in the Endoscopy suite for Ulcerative Colitis flare. Please refer to "Results" tab or paper chart for full report. Perry II inflammation seen from the rectum to the sigmoid, continuous.    RECOMMENDATIONS  Follow-up pathology  Resume regular diet  Continue Solumedrol 20mg Q8H  Discontinue antibiotics  Care otherwise as per consult note and per primary Medical team

## 2022-05-16 NOTE — PROGRESS NOTE ADULT - PROBLEM SELECTOR PLAN 3
Patient has a history of ulcerative colitis diagnosed when she was 7 years old. Reports she has been on medications in the past, last time in 2011. She cannot recall the last time she had a colonoscopy.  - GI consulted in [facility] - f/u recs in the AM   - Per GI, hold off on steroids for now   - Plan for possible flex sigmoidoscopy tomorrow   - F/u results of labs and stool studies as above Patient has a history of ulcerative colitis diagnosed when she was 7 years old. Reports she has been on medications in the past, last time in 2011. She cannot recall the last time she had a colonoscopy.   - Per GI, hold off on steroids for now   - Obtain Quantiferon prior to steroid exposure  - Calcium, Vitamin D, and PPI while on steroids  - Plan for possible flex sigmoidoscopy tomorrow   - F/u results of labs and stool studies as above Patient has a history of ulcerative colitis diagnosed when she was 7 years old. Reports she has been on medications in the past, last time in 2011. She cannot recall the last time she had a colonoscopy.   - Per GI, hold off on steroids for now. f/u GI recs  - Plan for possible flex sigmoidoscopy tomorrow   - F/u results of stool studies as above Patient has a history of ulcerative colitis diagnosed when she was 7 years old. Reports she has been on medications in the past, last time in 2011. She cannot recall the last time she had a colonoscopy.   - F/u GI steroid recs  - Plan for possible flex sigmoidoscopy tomorrow   - F/u results of stool studies as above

## 2022-05-16 NOTE — PROGRESS NOTE ADULT - PROBLEM SELECTOR PLAN 1
On admission met 2/4 SIRS criteria with T 101.6 F at [facility] and tachycardic with HR in the 100s on admission. Likely source is colitis seen on imaging.  - Lactate negative 0.7.  - Patient given Ceftriaxone and Flagyl in the ED   - F/u blood cxs, UA/Ucx  - S/p 2.6 L fluids in the ED  - Vitals improved   - Plan as below On admission met 2/4 SIRS criteria with T 101.6 F at [facility] and tachycardic with HR in the 100s on admission. Likely source is colitis seen on imaging.  - Lactate negative 0.7.  - Patient given Ceftriaxone and Flagyl in the ED   - UA shows ketonuria, trace RBCs, leukocyte esterase and nitrite neg  - F/u blood cxs, Ucx  - S/p 2.6 L fluids in the ED  - Vitals improved   - Plan as below

## 2022-05-16 NOTE — PROGRESS NOTE ADULT - PROBLEM SELECTOR PLAN 2
Patient presenting w/ bloody diarrhea, diffuse abdominal pain, nausea, fever and chills for one week. Last episode of bloody diarrhea was morning of admission - she reports she has had normal colored loose stool since then.   CTAP on admission showed pancolitis likely inflammatory in nature. Symptoms are likely 2/2 UC flare, but cannot rule out infectious process as well.  - S/p Ceftriaxone and Flagyl in the ED   - As suspicion for inflammatory process is high, will hold off on continuing abx for now pending GI PCR, C.diff and flex sig results   - GI consulted at [facility] - f/u recs in the AM   - Plan for possible flex sigmoidoscopy in the AM (NPO after midnight)   - F/u C. diff and GI PCR   - F/u ESR and CRP in the AM   - F/u hepatitis screening   - Plan as below Patient presenting w/ bloody diarrhea, diffuse abdominal pain, nausea, fever and chills for one week. Last episode of bloody diarrhea was morning of admission - she reports she has had normal colored loose stool since then.   CTAP on admission showed pancolitis likely inflammatory in nature. Symptoms are likely 2/2 UC flare, but cannot rule out infectious process as well.  - S/p Ceftriaxone and Flagyl in the ED   - As suspicion for inflammatory process is high, will hold off on continuing abx for now pending GI PCR, C.diff and flex sig results   - GI consulted at [facility] - f/u recs in the AM   - Plan for possible flex sigmoidoscopy in the AM (NPO after midnight)   - F/u C. diff and GI PCR   - CRP 62.1   - hepatitis panel negative   - Plan as below Patient presenting w/ bloody diarrhea, diffuse abdominal pain, nausea, fever and chills for one week. Last episode of bloody diarrhea was morning of admission - she reports she has had normal colored loose stool since then.   CTAP on admission showed pancolitis likely inflammatory in nature. Symptoms are likely 2/2 UC flare, but cannot rule out infectious process as well.  - S/p Ceftriaxone and Flagyl in the ED   - As suspicion for inflammatory process is high, will hold off on continuing abx for now pending GI PCR, C.diff and flex sig results   - GI consulted at [facility] - f/u recs in the AM   - Plan for possible flex sigmoidoscopy in the AM (NPO after midnight)   - F/u C. diff and GI PCR   - ESR 19, CRP 62.1 suggesting inflammatory process   - hepatitis panel negative   - Plan as below Patient presenting w/ bloody diarrhea, diffuse abdominal pain, nausea, fever and chills for one week. Last episode of bloody diarrhea was morning of admission - she reports she has had normal colored loose stool since then.   CTAP on admission showed pancolitis likely inflammatory in nature. Symptoms are likely 2/2 UC flare, but cannot rule out infectious process as well.  - S/p Ceftriaxone and Flagyl in the ED   - As suspicion for inflammatory process is high, will hold off on continuing abx for now pending GI PCR. C.diff negative   - Per GI recs, if no obvious infectious etiology identified, would start Solumedrol 20mg Q8H and d/c antibiotics  - Clear liquid diet for now, plan for possible flex sigmoidoscopy in the AM if necessary (NPO after midnight)   - ESR 19, CRP 62.1 suggesting inflammatory process   - hepatitis panel negative   - Plan as below

## 2022-05-16 NOTE — PATIENT PROFILE ADULT - FALL HARM RISK - UNIVERSAL INTERVENTIONS
Bed in lowest position, wheels locked, appropriate side rails in place/Call bell, personal items and telephone in reach/Instruct patient to call for assistance before getting out of bed or chair/Non-slip footwear when patient is out of bed/[place] to call system/Physically safe environment - no spills, clutter or unnecessary equipment/Purposeful Proactive Rounding/Room/bathroom lighting operational, light cord in reach

## 2022-05-16 NOTE — CONSULT NOTE ADULT - ATTENDING COMMENTS
Young Female with past history of Ulcerative colitis, (diagnosed at 7 years of age, not on any long term management) presented with acute flare for last 1 week, also endorses weight loss for last 1 month, labs significant for elevated CRP and imaging showing pancolitis.   Will plan to do Sigmoidoscopy to eval for mucosal disease  R/o infection and then plan to start on steroids   Rest plan as per fellow note.

## 2022-05-16 NOTE — PROGRESS NOTE ADULT - SUBJECTIVE AND OBJECTIVE BOX
INTERVAL HPI/OVERNIGHT EVENTS: JOHN.    SUBJECTIVE: Patient was seen and examined at bedside. Patient resting comfortably. No complaints at this time. Patient denies chills, dizziness, weakness, nausea, vomiting, headaches, chest pain/tightness, palpitations, dyspnea, cough, dysuria, and changes in bowel movements.    VITALS:  T(F): 98.1 (22 @ 06:21)  HR: 83 (22 @ 06:21)  BP: 87/51 (22 @ 06:21)  RR: 18 (22 @ 06:21)  SpO2: 99% (22 @ 06:21)  Wt(kg): --    PHYSICAL EXAM:  Gen: NAD, laying in bed, well appearing, alert, interactive  HEENT: MMM, anicteric sclera, no JVD, no cervical or supraclavicular LAD  Cardio: +S1/S2, RRR, no murmurs  Resp: breathing comfortably, speaking in full sentences, CTA b/l, no w/r/r  GI: soft, +BS x4, NT/ND  Ext: no peripheral edema, NROM x4  Vasc: 2+ radial, DP, and PT pulses  Skin: warm, dry, and intact. No rashes, wounds or scars  Neuro: AAOx3, CN II-XII intact, no focal neurologic deficits    MEDICATIONS  (STANDING):  lactated ringers. 1000 milliLiter(s) (40 mL/Hr) IV Continuous <Continuous>    MEDICATIONS  (PRN):  acetaminophen     Tablet .. 650 milliGRAM(s) Oral every 6 hours PRN Temp greater or equal to 38C (100.4F), Mild Pain (1 - 3), Moderate Pain (4 - 6)  ondansetron    Tablet 4 milliGRAM(s) Oral every 8 hours PRN Nausea and/or Vomiting    Allergies    amoxicillin (Stomach Upset)  erythromycin (Stomach Upset)    Intolerances      LABS:                        11.9   8.36  )-----------( 203      ( 16 May 2022 06:25 )             33.9         137  |  103  |  2<L>  ----------------------------<  69<L>  3.8   |  23  |  0.72    Ca    8.8      16 May 2022 06:25  Phos  3.1     05-16  Mg     1.8     05-16    TPro  5.9<L>  /  Alb  3.6  /  TBili  0.7  /  DBili  x   /  AST  13  /  ALT  10  /  AlkPhos  54  05-16    PT/INR - ( 16 May 2022 06:25 )   PT: 14.7 sec;   INR: 1.23          PTT - ( 16 May 2022 06:25 )  PTT:32.5 sec  Urinalysis Basic - ( 16 May 2022 01:14 )    Color: Yellow / Appearance: Clear / S.020 / pH: x  Gluc: x / Ketone: >=80 mg/dL  / Bili: Negative / Urobili: 0.2 E.U./dL   Blood: x / Protein: NEGATIVE mg/dL / Nitrite: NEGATIVE   Leuk Esterase: NEGATIVE / RBC: < 5 /HPF / WBC < 5 /HPF   Sq Epi: x / Non Sq Epi: 0-5 /HPF / Bacteria: Present /HPF      CAPILLARY BLOOD GLUCOSE                     INTERVAL HPI/OVERNIGHT EVENTS: JOHN.    SUBJECTIVE: Patient was seen and examined at bedside. Patient resting comfortably. Patient has stated she has not slept in 2 days and continues to have bloody BMs, Last bloody BM 2 hours prior. Patient denies chills, dizziness, weakness, nausea, vomiting, headaches, chest pain/tightness, palpitations, dyspnea, cough, dysuria.    VITALS:  T(F): 98.1 (22 @ 06:21)  HR: 83 (22 @ 06:21)  BP: 87/51 (22 @ 06:21)  RR: 18 (22 @ 06:21)  SpO2: 99% (22 @ 06:21)  Wt(kg): --    PHYSICAL EXAM:  Gen: NAD, laying in bed, well appearing, alert, interactive  HEENT: DMM, anicteric sclera, no JVD, no cervical or supraclavicular LAD  Cardio: +S1/S2, RRR, no murmurs  Resp: breathing comfortably, speaking in full sentences, CTA b/l, no w/r/r  GI: soft, +BS x4, TTP in LUQ and RUQ. No CVA tenderness, rebound tenderness. Negative Foster's  Ext: no peripheral edema, NROM x4  Vasc: 2+ radial, DP, and PT pulses  Skin: warm, dry, and intact. No rashes, wounds or scars  Neuro: AAOx3, CN II-XII intact, no focal neurologic deficits, lower back pain on palpation     MEDICATIONS  (STANDING):  lactated ringers. 1000 milliLiter(s) (40 mL/Hr) IV Continuous <Continuous>    MEDICATIONS  (PRN):  acetaminophen     Tablet .. 650 milliGRAM(s) Oral every 6 hours PRN Temp greater or equal to 38C (100.4F), Mild Pain (1 - 3), Moderate Pain (4 - 6)  ondansetron    Tablet 4 milliGRAM(s) Oral every 8 hours PRN Nausea and/or Vomiting    Allergies    amoxicillin (Stomach Upset)  erythromycin (Stomach Upset)    Intolerances      LABS:                        11.9   8.36  )-----------( 203      ( 16 May 2022 06:25 )             33.9         137  |  103  |  2<L>  ----------------------------<  69<L>  3.8   |  23  |  0.72    Ca    8.8      16 May 2022 06:25  Phos  3.1     05-  Mg     1.8     -    TPro  5.9<L>  /  Alb  3.6  /  TBili  0.7  /  DBili  x   /  AST  13  /  ALT  10  /  AlkPhos  54  05-16    PT/INR - ( 16 May 2022 06:25 )   PT: 14.7 sec;   INR: 1.23          PTT - ( 16 May 2022 06:25 )  PTT:32.5 sec  Urinalysis Basic - ( 16 May 2022 01:14 )    ESR - 19 (16 May 22 06:25)  CRP - 62.1 (16 May 22 06:25)    Hepatitis C Core IgM Antibody - Nonreactive (16 May 22 06:25)  Hepatitis B Surface Antigen - Nonreactive (16 May 22 06:25)  Hepatitis A IgM Antibody - Nonreactive (16 May 22 06:25)  Hepatitis C Virus S/CO Ratio - 0.04 (16 May 22 06:25)  Hepatitis C Virus Interpretation - Nonreactive (16 May 22 06:25)    Clostridium difficile GD Interpretation - Negative (16 May 22 00:53)    Color: Yellow / Appearance: Clear / S.020 / pH: x  Gluc: x / Ketone: >=80 mg/dL  / Bili: Negative / Urobili: 0.2 E.U./dL   Blood: x / Protein: NEGATIVE mg/dL / Nitrite: NEGATIVE   Leuk Esterase: NEGATIVE / RBC: < 5 /HPF / WBC < 5 /HPF   Sq Epi: x / Non Sq Epi: 0-5 /HPF / Bacteria: Present /HPF

## 2022-05-16 NOTE — PROGRESS NOTE ADULT - PROBLEM SELECTOR PLAN 4
- Plan as above for UC and bloody diarrhea   - Patient received Morphine in the ED for pain   - Tylenol PRN for abdominal pain

## 2022-05-16 NOTE — PROGRESS NOTE ADULT - PROBLEM SELECTOR PLAN 6
WBC 11.36 on admission w/ elevated neutrophils. Likely elevated in the setting of pancolitis.  - Plan as above   - No other signs or symptoms of other infection besides colitis   - F/u stool studies, blood cultures

## 2022-05-16 NOTE — PROGRESS NOTE ADULT - ATTENDING COMMENTS
# ulcerative colitis   flex sig today. f/u path. start steroids per GI     # incidental finding of 1.2cm hemangioma of liver and liver hypo-densities too small to characterize  discussed finding with patient, sister and mother who were at bedside

## 2022-05-16 NOTE — PROGRESS NOTE ADULT - PROBLEM SELECTOR PLAN 7
F: s/p 2.6 L NS in the ED. LR 40 cc/hr overnight   E: replete as needed   N: NPO for now pending GI recs in the morning   DVT prophylaxis: holding prior to procedre tomorrow - f/u GI recs after flex sig as patient should be on AC for inflammatory colitis  GI prophylaxis: none needed     Dispo: [facility] F: s/p 2.6 L NS in the ED. LR 40 cc/hr overnight   E: replete as needed   N: NPO for now pending GI recs in the morning   DVT prophylaxis: Per GI, Continue pharmacologic DVT ppx w lovenox  GI prophylaxis: none needed     Dispo: [facility]

## 2022-05-16 NOTE — CONSULT NOTE ADULT - ASSESSMENT
26yF w/Hx of UC pancolitis off all medications for ~10 years, presenting with one week of bloody diarrhea, generalized abdominal pain, nausea, fevers, and chills. GI consulted for concern for UC flare.    Bloody diarrhea - Patient with history of UC Pancolitis, history of C diff infection. Reports prior exposures to 5-ASA derivatives, 6MP, and intermittent steroids through course of disease. Last colonoscopy >10 years ago during which she reported a "traumatic experience" and has not wanted a repeat despite elevated colorectal cancer risk. Symptoms similar to prior flares which have been responsive to steroids in the past. Endorses weight loss over the course of the past month, but cannot quantify. Endorses family history of IBD in sister who was diagnosed with Crohn's 4 years ago. If infectious etiologies ruled out, presentation likely reflects a moderate to severe UC flare.  - Follow-up C diff and GI PCR  - If no obvious infectious etiology identified, would start Solumedrol 20mg Q8H and discontinue antibiotics  - Calcium, Vitamin D, and PPI while on steroids  - Obtain Hepatitis B surface antibody to ascertain status of immunity  - Obtain Quantiferon prior to steroid exposure  - Clear liquid diet at this time  - NPO at midnight with tentative plan for flexible sigmoidoscopy tomorrow if necessary    Recommendations discussed with primary team  Case discussed with attending physician 26yF w/Hx of UC pancolitis off all medications for ~10 years, presenting with one week of bloody diarrhea, generalized abdominal pain, nausea, fevers, and chills. GI consulted for concern for UC flare.    Bloody diarrhea - Patient with history of UC Pancolitis, history of C diff infection. Reports prior exposures to 5-ASA derivatives, 6MP, and intermittent steroids through course of disease. Last colonoscopy >10 years ago during which she reported a "traumatic experience" and has not wanted a repeat despite elevated colorectal cancer risk. Symptoms similar to prior flares which have been responsive to steroids in the past. Endorses weight loss over the course of the past month, but cannot quantify. Endorses family history of IBD in sister who was diagnosed with Crohn's 4 years ago. If infectious etiologies ruled out, presentation likely reflects a moderate to severe UC flare.  - Follow-up C diff and GI PCR  - If no obvious infectious etiology identified, would start Solumedrol 20mg Q8H and discontinue antibiotics  - Calcium, Vitamin D, and PPI while on steroids  - Obtain Hepatitis B surface antibody to ascertain status of immunity  - Obtain Quantiferon prior to steroid exposure  - Continue pharmacologic DVT ppx  - Clear liquid diet at this time  - NPO at midnight with tentative plan for flexible sigmoidoscopy tomorrow if necessary    Recommendations discussed with primary team  Case discussed with attending physician

## 2022-05-16 NOTE — CONSULT NOTE ADULT - SUBJECTIVE AND OBJECTIVE BOX
GASTROENTEROLOGY CONSULT NOTE  HPI:  Patient is a 27 yo F, PMH of ulcerative colitis (not on meds since 2011, follows with gastroenterologist Dr. Nick at [place]), presenting w/ one week of worsening bloody diarrhea (6-10 episodes per day), generalized abdominal pain, nausea, and one fever and chills yesterday. Patient has a history of UC (diagnosed when she was 7 years old) and was told she had pancolitis. Was treated w/ several medications in the past, the last time was in 2011. Patient reports she stopped taking medications at that time because she was feeling better. She cannot recall the last time she had a colonoscopy but thinks around 10 years ago. This past December 2021, she went to see Dr. Nick at [place] because she felt that she was having more frequent bowel movements. Dr. Nick recommended repeat colonoscopy which the patient has not yet completed. She reports her symptoms started worsening ~2 months ago, and then significantly in the last week. Reports frequent bloody diarrhea bowel movements (typically around 6 per day), generalized abdominal pain but worst in lower quadrants, lower back pain, pain on defecation, and nausea. Reports she has not eaten much in the last week. Also felt warm w/ chills at home yesterday but did not measure her temperature. Patient reports she ate a whole pie of [employer] pizza around 1 week ago and felt that her symptoms were triggered shortly after that. She denies recent travel, sick contacts, chest pain, SOB, urinary symptoms, light headedness, or dizziness. She is currently finishing her menstrual cycle.    Of note, patient has a family history of colon and stomach cancer in her grandparents and Crohn's disease in her older sister. She also had C. diff when she was 7 years old and reports she had Norovirus in December 2021.    ED Course:  ED Vitals: T 100.4 F, /83, , RR 19, 99% O2 on RA   ED Labs: WBC 11.36, Hgb 13.8, K 3.2, lactate 0.7, lipase 71. HCG <1.  COVID negative   UA: positive for ketones  EKG: sinus tachycardia   CTAP: Pancolitis likely of inflammatory etiology.    GI consulted at [facility]. Recommended testing for GI PCR and C. diff, hep B/C screening, QuantiFeron, ESR, CRP, NPO past midnight and possible flex sigmoidoscopy tomorrow.      In the ED, patient was given Zofran 4 mg, Tylenol, Ceftriaxone 1 g, Flagyl 500 mg, Morphine 2 mg, potassium chloride 40 mg, and 2.6 L NS    (15 May 2022 23:57)    Allergies    amoxicillin (Stomach Upset)  erythromycin (Stomach Upset)    Intolerances      Home Medications:    MEDICATIONS:  MEDICATIONS  (STANDING):  enoxaparin Injectable 40 milliGRAM(s) SubCutaneous every 24 hours  lactated ringers. 1000 milliLiter(s) (70 mL/Hr) IV Continuous <Continuous>    MEDICATIONS  (PRN):  acetaminophen     Tablet .. 650 milliGRAM(s) Oral every 6 hours PRN Temp greater or equal to 38C (100.4F), Mild Pain (1 - 3), Moderate Pain (4 - 6)  ondansetron    Tablet 4 milliGRAM(s) Oral every 8 hours PRN Nausea and/or Vomiting    PAST MEDICAL & SURGICAL HISTORY:  Ulcerative colitis        FAMILY HISTORY:  FH: Crohn&#x27;s disease (Sibling)    FH: colon cancer (Grandparent)      SOCIAL HISTORY:  As above    REVIEW OF SYSTEMS:  CONSTITUTIONAL: As above  HEENT: No visual changes; No vertigo or throat pain   NECK: No pain or stiffness  RESPIRATORY: No cough, wheezing, hemoptysis; No shortness of breath  CARDIOVASCULAR: No chest pain or palpitations  GASTROINTESTINAL: As above  GENITOURINARY: No dysuria, frequency or hematuria  NEUROLOGICAL: No numbness or weakness  SKIN: No itching, burning, rashes, or lesions   All other 10 review of systems is negative unless indicated above.    Vital Signs Last 24 Hrs  T(C): 36.7 (16 May 2022 06:21), Max: 38.7 (15 May 2022 21:06)  T(F): 98.1 (16 May 2022 06:21), Max: 101.6 (15 May 2022 21:06)  HR: 83 (16 May 2022 06:21) (83 - 132)  BP: 87/51 (16 May 2022 06:21) (87/51 - 127/83)  BP(mean): --  RR: 18 (16 May 2022 06:21) (16 - 20)  SpO2: 99% (16 May 2022 06:21) (96% - 99%)      PHYSICAL EXAM:    General: Upset, NAD  Eyes: Anicteric sclerae, moist conjunctivae  HENT: Moist mucous membranes  Neck: Trachea midline, supple  Lungs: Normal respiratory effort, no intercostal retractions  Cardiovascular: RRR  Abdomen: Soft, non-distended, mildly tender to palpation across the suprapubic region  Extremities: Normal range of motion, No clubbing, cyanosis or edema  Neurological: Alert and oriented x3  Skin: Warm and dry. No obvious rash    LABS:                        11.9   8.36  )-----------( 203      ( 16 May 2022 06:25 )             33.9     05-16    137  |  103  |  2<L>  ----------------------------<  69<L>  3.8   |  23  |  0.72    Ca    8.8      16 May 2022 06:25  Phos  3.1     05-16  Mg     1.8     05-16    TPro  5.9<L>  /  Alb  3.6  /  TBili  0.7  /  DBili  x   /  AST  13  /  ALT  10  /  AlkPhos  54  05-16        PT/INR - ( 16 May 2022 06:25 )   PT: 14.7 sec;   INR: 1.23          PTT - ( 16 May 2022 06:25 )  PTT:32.5 sec    RADIOLOGY & ADDITIONAL STUDIES:     Reviewed

## 2022-05-17 DIAGNOSIS — G47.00 INSOMNIA, UNSPECIFIED: ICD-10-CM

## 2022-05-17 LAB
ANION GAP SERPL CALC-SCNC: 14 MMOL/L — SIGNIFICANT CHANGE UP (ref 5–17)
BASOPHILS # BLD AUTO: 0.01 K/UL — SIGNIFICANT CHANGE UP (ref 0–0.2)
BASOPHILS NFR BLD AUTO: 0.1 % — SIGNIFICANT CHANGE UP (ref 0–2)
BILIRUB SERPL-MCNC: 0.6 MG/DL — SIGNIFICANT CHANGE UP (ref 0.2–1.2)
BLD GP AB SCN SERPL QL: NEGATIVE — SIGNIFICANT CHANGE UP
BUN SERPL-MCNC: 5 MG/DL — LOW (ref 7–23)
CALCIUM SERPL-MCNC: 9.5 MG/DL — SIGNIFICANT CHANGE UP (ref 8.4–10.5)
CHLORIDE SERPL-SCNC: 98 MMOL/L — SIGNIFICANT CHANGE UP (ref 96–108)
CO2 SERPL-SCNC: 24 MMOL/L — SIGNIFICANT CHANGE UP (ref 22–31)
CREAT SERPL-MCNC: 0.6 MG/DL — SIGNIFICANT CHANGE UP (ref 0.5–1.3)
CULTURE RESULTS: NO GROWTH — SIGNIFICANT CHANGE UP
EGFR: 126 ML/MIN/1.73M2 — SIGNIFICANT CHANGE UP
EOSINOPHIL # BLD AUTO: 0 K/UL — SIGNIFICANT CHANGE UP (ref 0–0.5)
EOSINOPHIL NFR BLD AUTO: 0 % — SIGNIFICANT CHANGE UP (ref 0–6)
GAMMA INTERFERON BACKGROUND BLD IA-ACNC: 0.29 IU/ML — SIGNIFICANT CHANGE UP
GLUCOSE SERPL-MCNC: 116 MG/DL — HIGH (ref 70–99)
HCT VFR BLD CALC: 38.3 % — SIGNIFICANT CHANGE UP (ref 34.5–45)
HGB BLD-MCNC: 13.4 G/DL — SIGNIFICANT CHANGE UP (ref 11.5–15.5)
IMM GRANULOCYTES NFR BLD AUTO: 0.6 % — SIGNIFICANT CHANGE UP (ref 0–1.5)
INR BLD: 1.14 — SIGNIFICANT CHANGE UP (ref 0.88–1.16)
LYMPHOCYTES # BLD AUTO: 1.41 K/UL — SIGNIFICANT CHANGE UP (ref 1–3.3)
LYMPHOCYTES # BLD AUTO: 13 % — SIGNIFICANT CHANGE UP (ref 13–44)
M TB IFN-G BLD-IMP: NEGATIVE — SIGNIFICANT CHANGE UP
M TB IFN-G CD4+ BCKGRND COR BLD-ACNC: 0.1 IU/ML — SIGNIFICANT CHANGE UP
M TB IFN-G CD4+CD8+ BCKGRND COR BLD-ACNC: -0.01 IU/ML — SIGNIFICANT CHANGE UP
MAGNESIUM SERPL-MCNC: 1.7 MG/DL — SIGNIFICANT CHANGE UP (ref 1.6–2.6)
MCHC RBC-ENTMCNC: 33.3 PG — SIGNIFICANT CHANGE UP (ref 27–34)
MCHC RBC-ENTMCNC: 35 GM/DL — SIGNIFICANT CHANGE UP (ref 32–36)
MCV RBC AUTO: 95 FL — SIGNIFICANT CHANGE UP (ref 80–100)
MELD SCORE WITH DIALYSIS: 22 POINTS — SIGNIFICANT CHANGE UP
MELD SCORE WITHOUT DIALYSIS: 8 POINTS — SIGNIFICANT CHANGE UP
MONOCYTES # BLD AUTO: 0.45 K/UL — SIGNIFICANT CHANGE UP (ref 0–0.9)
MONOCYTES NFR BLD AUTO: 4.1 % — SIGNIFICANT CHANGE UP (ref 2–14)
NEUTROPHILS # BLD AUTO: 8.94 K/UL — HIGH (ref 1.8–7.4)
NEUTROPHILS NFR BLD AUTO: 82.2 % — HIGH (ref 43–77)
NRBC # BLD: 0 /100 WBCS — SIGNIFICANT CHANGE UP (ref 0–0)
PHOSPHATE SERPL-MCNC: 3.9 MG/DL — SIGNIFICANT CHANGE UP (ref 2.5–4.5)
PLATELET # BLD AUTO: 252 K/UL — SIGNIFICANT CHANGE UP (ref 150–400)
POTASSIUM SERPL-MCNC: 3.9 MMOL/L — SIGNIFICANT CHANGE UP (ref 3.5–5.3)
POTASSIUM SERPL-SCNC: 3.9 MMOL/L — SIGNIFICANT CHANGE UP (ref 3.5–5.3)
PROTHROM AB SERPL-ACNC: 13.6 SEC — HIGH (ref 10.5–13.4)
QUANT TB PLUS MITOGEN MINUS NIL: 9.71 IU/ML — SIGNIFICANT CHANGE UP
RBC # BLD: 4.03 M/UL — SIGNIFICANT CHANGE UP (ref 3.8–5.2)
RBC # FLD: 11.7 % — SIGNIFICANT CHANGE UP (ref 10.3–14.5)
RH IG SCN BLD-IMP: POSITIVE — SIGNIFICANT CHANGE UP
SODIUM SERPL-SCNC: 136 MMOL/L — SIGNIFICANT CHANGE UP (ref 135–145)
SPECIMEN SOURCE: SIGNIFICANT CHANGE UP
WBC # BLD: 10.88 K/UL — HIGH (ref 3.8–10.5)
WBC # FLD AUTO: 10.88 K/UL — HIGH (ref 3.8–10.5)

## 2022-05-17 PROCEDURE — 99233 SBSQ HOSP IP/OBS HIGH 50: CPT | Mod: GC

## 2022-05-17 PROCEDURE — 99232 SBSQ HOSP IP/OBS MODERATE 35: CPT

## 2022-05-17 RX ORDER — MAGNESIUM SULFATE 500 MG/ML
1 VIAL (ML) INJECTION ONCE
Refills: 0 | Status: COMPLETED | OUTPATIENT
Start: 2022-05-17 | End: 2022-05-17

## 2022-05-17 RX ORDER — TRAZODONE HCL 50 MG
25 TABLET ORAL AT BEDTIME
Refills: 0 | Status: DISCONTINUED | OUTPATIENT
Start: 2022-05-17 | End: 2022-05-18

## 2022-05-17 RX ORDER — TRAZODONE HCL 50 MG
50 TABLET ORAL AT BEDTIME
Refills: 0 | Status: DISCONTINUED | OUTPATIENT
Start: 2022-05-17 | End: 2022-05-17

## 2022-05-17 RX ADMIN — Medication 1 TABLET(S): at 15:06

## 2022-05-17 RX ADMIN — Medication 20 MILLIGRAM(S): at 21:59

## 2022-05-17 RX ADMIN — Medication 20 MILLIGRAM(S): at 15:06

## 2022-05-17 RX ADMIN — Medication 100 GRAM(S): at 12:50

## 2022-05-17 RX ADMIN — Medication 20 MILLIGRAM(S): at 06:25

## 2022-05-17 NOTE — PROGRESS NOTE ADULT - ATTENDING COMMENTS
26 F admitted for Acute Severe Ulcerative Colitis, s/p Sigmoidoscopy with Perry 2 disease, started on Steroids   Seen bedside today, reports improvement in symptoms, had 3 watery bowel movements by 11 AM (for past 1 week she was having 6-7 in morning), no blood reported, abdominal pain improved, reports regaining appetite.   Continue steroids, repeat CRP tomorrow,   Advised to eat food as tolerated,  Patient was diagnosed 10 years ago, reports her sister has Crohn Colitis, when asked specifically she reports she was diagnosed with UC. Once the current flare episode has resolved she would full colon and small bowel eval to make clear diagnosis.   Rest plan as per fellow note.

## 2022-05-17 NOTE — PROGRESS NOTE ADULT - PROBLEM SELECTOR PLAN 7
F: s/p 2.6 L NS in the ED. LR 40 cc/hr overnight   E: replete as needed   N: NPO for now pending GI recs in the morning   DVT prophylaxis: Per GI, Continue pharmacologic DVT ppx w lovenox  GI prophylaxis: none needed     Dispo: [facility] F: s/p 2.6 L NS in the ED. LR 40 cc/hr overnight   E: replete as needed   N: normal diet  DVT prophylaxis: Per GI, Continue pharmacologic DVT ppx w lovenox  GI prophylaxis: none needed     Dispo: [facility]

## 2022-05-17 NOTE — PROGRESS NOTE ADULT - PROBLEM SELECTOR PLAN 6
WBC 11.36 on admission w/ elevated neutrophils. Likely elevated in the setting of pancolitis.  - Plan as above   - No other signs or symptoms of other infection besides colitis   - F/u stool studies, blood cultures WBC 11.36 on admission w/ elevated neutrophils. Likely elevated in the setting of pancolitis.  - Plan as above   - No other signs or symptoms of other infection besides colitis   -stool studies, blood cultures negative

## 2022-05-17 NOTE — PROGRESS NOTE ADULT - PROBLEM SELECTOR PLAN 4
- Plan as above for UC and bloody diarrhea   - Patient received Morphine in the ED for pain   - Tylenol PRN for abdominal pain - Plan as above for UC and bloody diarrhea   - Tylenol PRN for abdominal pain

## 2022-05-17 NOTE — PROGRESS NOTE ADULT - PROBLEM SELECTOR PLAN 8
Pt reports inability to sleep while in hospital, and has "broken sleep" at home as well as bouts of insomnia  - Prescribe trazodone for 50 mg po daily for 1 week prn  - f/u with new PCP Justina Johnson Pt reports inability to sleep while in hospital, and has "broken sleep" at home as well as bouts of insomnia  - Trial of trazodone 25mg PO tonight. If sleep improves tonight, can prescribe 1-2 week supply to be taken prn until patient establishes care with new PCP   - f/u with new PCP Estelle Arellano vs [facility]

## 2022-05-17 NOTE — PROGRESS NOTE ADULT - PROBLEM SELECTOR PLAN 3
Patient has a history of ulcerative colitis diagnosed when she was 7 years old. Reports she has been on medications in the past, last time in 2011. She cannot recall the last time she had a colonoscopy.   - F/u GI steroid recs  - Plan for possible flex sigmoidoscopy tomorrow   - F/u results of stool studies as above Patient has a history of ulcerative colitis diagnosed when she was 7 years old. Reports she has been on medications in the past, last time in 2011. She cannot recall the last time she had a colonoscopy.   - S/p flex sigmoidoscopy with Perry II colitis   - Per GI recs, Continue Solumedrol IV 20mg Q8H  - Calcium, Vitamin D, and PPI while on steroids  - Obtain Hepatitis B surface antibody to ascertain status of immunity  - Follow-up Quantiferon  - Trend CRP Patient has a history of ulcerative colitis diagnosed when she was 7 years old. Reports she has been on medications in the past, last time in 2011. She cannot recall the last time she had a colonoscopy.   - S/p flex sigmoidoscopy with Perry II colitis   - Per GI recs, Continue Solumedrol IV 20mg Q8H for one more day  - Calcium, Vitamin D, and PPI while on steroids  - Obtain Hepatitis B surface antibody to ascertain status of immunity  - Follow-up Quantiferon  - Trend CRP

## 2022-05-17 NOTE — PROGRESS NOTE ADULT - PROBLEM SELECTOR PLAN 1
On admission met 2/4 SIRS criteria with T 101.6 F at [facility] and tachycardic with HR in the 100s on admission. Likely source is colitis seen on imaging.  - Lactate negative 0.7.  - Patient given Ceftriaxone and Flagyl in the ED   - UA shows ketonuria, trace RBCs, leukocyte esterase and nitrite neg  - F/u blood cxs, Ucx  - S/p 2.6 L fluids in the ED  - Vitals improved   - Plan as below On admission met 2/4 SIRS criteria [facility] and tachycardic with HR in the 100s on admission. Likely source is colitis seen on imaging.  - Blood cxs, Ucx BGTD  - Vitals improved, sepsis resolved   - Plan as below On admission met 2[facility] and tachy[facility]ic with HR in the 100s on admission. Likely source is colitis seen on imaging.  - Blood cxs, Ucx NGTD  - Vitals improved, sepsis resolved   - Plan as below

## 2022-05-17 NOTE — PROGRESS NOTE ADULT - ATTENDING COMMENTS
# Ulcerative Colitis   Abd pain improving, bowel movements less bloody, Had 3 formed bowel movements between midnight and 10am today   Agree with GI recs for additional day of IV steroids ; assess response tomorrow.     # Insomnia   Minimal relief with melatonin last night   trial low dose trazodone 25mg tonight     # incidental finding of 1.2cm hemangioma of liver and liver hypo-densities too small to characterize  discussed finding with patient, sister and mother who were at bedside yesterday SIRS positvive --- no infectious source identified. Does not meet criteria for sepsis given no infectious source found. Likely SIRS+ from ulcerative colitis flare and associated diarrhea.       # Ulcerative Colitis   Abd pain improving, bowel movements less bloody, Had 3 formed bowel movements between midnight and 10am today   Agree with GI recs for additional day of IV steroids ; assess response tomorrow.     # Insomnia   Minimal relief with melatonin last night   trial low dose trazodone 25mg tonight     # incidental finding of 1.2cm hemangioma of liver and liver hypo-densities too small to characterize  discussed finding with patient, sister and mother who were at bedside yesterday

## 2022-05-17 NOTE — PROGRESS NOTE ADULT - PROBLEM SELECTOR PLAN 2
Patient presenting w/ bloody diarrhea, diffuse abdominal pain, nausea, fever and chills for one week. Last episode of bloody diarrhea was morning of admission - she reports she has had normal colored loose stool since then.   CTAP on admission showed pancolitis likely inflammatory in nature. Symptoms are likely 2/2 UC flare, but cannot rule out infectious process as well.  - S/p Ceftriaxone and Flagyl in the ED   - As suspicion for inflammatory process is high, will hold off on continuing abx for now pending GI PCR. C.diff negative   - Per GI recs, if no obvious infectious etiology identified, would start Solumedrol 20mg Q8H and d/c antibiotics  - Clear liquid diet for now, plan for possible flex sigmoidoscopy in the AM if necessary (NPO after midnight)   - ESR 19, CRP 62.1 suggesting inflammatory process   - hepatitis panel negative   - Plan as below Patient presenting w/ bloody diarrhea, diffuse abdominal pain, nausea, fever and chills for one week. Last episode of bloody diarrhea was morning of admission - she reports she has had normal colored loose stool since then.   CTAP on admission showed pancolitis likely inflammatory in nature. Symptoms are likely 2/2 UC flare,   - Cdiff negative, GI PCR negative  - Solumedrol 20mg Q8H   - Normal diet, s/p flex sigmoidoscopy   - Plan as below Patient presenting w/ bloody diarrhea, diffuse abdominal pain, nausea, fever and chills for one week. Last episode of bloody diarrhea was morning of admission - she reports she has had normal colored loose stool since then.   CTAP on admission showed pancolitis likely inflammatory in nature. Symptoms are likely 2/2 UC flare,   - Cdiff, GI PCR negative  - Solumedrol 20mg Q8H for one more day  - Normal diet, s/p flex sigmoidoscopy   - Plan as below

## 2022-05-17 NOTE — PROGRESS NOTE ADULT - TIME BILLING
Bedside interview, exam. Discussed plan of care with patient and mother who was at bedside.   Discussed options for sleep disturbance, presented patient with printout containing information about trazodone and possible side effects  Reviewed, labs, vitals, plan of care with housestaff.

## 2022-05-17 NOTE — PROGRESS NOTE ADULT - SUBJECTIVE AND OBJECTIVE BOX
INTERVAL HPI/OVERNIGHT EVENTS: JOHN.    SUBJECTIVE: Patient was seen and examined at bedside. Patient resting comfortably. No complaints at this time.    VITALS:  T(F): 97.8 (22 @ 10:07)  HR: 78 (22 @ 10:07)  BP: 99/63 (22 @ 10:07)  RR: 18 (22 @ 10:07)  SpO2: 98% (22 @ 10:07)  Wt(kg): --    PHYSICAL EXAM:  Gen: NAD, laying in bed, well appearing, alert, interactive  HEENT: DMM, anicteric sclera, no JVD, no cervical or supraclavicular LAD  Cardio: +S1/S2, RRR, no murmurs  Resp: breathing comfortably, speaking in full sentences, CTA b/l, no w/r/r  GI: soft, +BS x4, TTP in LUQ and RUQ. No CVA tenderness, rebound tenderness. Negative Foster's  Ext: no peripheral edema, NROM x4  Vasc: 2+ radial, DP, and PT pulses  Skin: warm, dry, and intact. No rashes, wounds or scars  Neuro: AAOx3, CN II-XII intact, no focal neurologic deficits, lower back pain on palpation     MEDICATIONS  (STANDING):  enoxaparin Injectable 40 milliGRAM(s) SubCutaneous every 24 hours  lactated ringers. 1000 milliLiter(s) (70 mL/Hr) IV Continuous <Continuous>  magnesium sulfate  IVPB 1 Gram(s) IV Intermittent once  melatonin 3 milliGRAM(s) Oral at bedtime  methylPREDNISolone sodium succinate Injectable 20 milliGRAM(s) IV Push every 8 hours    MEDICATIONS  (PRN):  acetaminophen     Tablet .. 650 milliGRAM(s) Oral every 6 hours PRN Temp greater or equal to 38C (100.4F), Mild Pain (1 - 3), Moderate Pain (4 - 6)  ondansetron    Tablet 4 milliGRAM(s) Oral every 8 hours PRN Nausea and/or Vomiting  traZODone 50 milliGRAM(s) Oral at bedtime PRN Insomnia    Allergies    amoxicillin (Stomach Upset)  erythromycin (Stomach Upset)    Intolerances      LABS:                        13.4   10.88 )-----------( 252      ( 17 May 2022 07:55 )             38.3         136  |  98  |  5<L>  ----------------------------<  116<H>  3.9   |  24  |  0.60    Ca    9.5      17 May 2022 07:55  Phos  3.9       Mg     1.7         TPro  x   /  Alb  x   /  TBili  0.6  /  DBili  x   /  AST  x   /  ALT  x   /  AlkPhos  x       PT/INR - ( 17 May 2022 07:55 )   PT: 13.6 sec;   INR: 1.14          PTT - ( 16 May 2022 06:25 )  PTT:32.5 sec  Urinalysis Basic - ( 16 May 2022 01:14 )    Color: Yellow / Appearance: Clear / S.020 / pH: x  Gluc: x / Ketone: >=80 mg/dL  / Bili: Negative / Urobili: 0.2 E.U./dL   Blood: x / Protein: NEGATIVE mg/dL / Nitrite: NEGATIVE   Leuk Esterase: NEGATIVE / RBC: < 5 /HPF / WBC < 5 /HPF   Sq Epi: x / Non Sq Epi: 0-5 /HPF / Bacteria: Present /HPF      CAPILLARY BLOOD GLUCOSE                     INTERVAL HPI/OVERNIGHT EVENTS: JOHN.    SUBJECTIVE: Patient was seen and examined at bedside. Patient resting comfortably, s/p flex sig on . Pt reports minimal sleep and anxiety while trying to eat any food. Pt had 3 BMs since last night, reports they were dark green in color. No pain or other complaints.     VITALS:  T(F): 97.8 (22 @ 10:07)  HR: 78 (22 @ 10:07)  BP: 99/63 (22 @ 10:07)  RR: 18 (22 @ 10:07)  SpO2: 98% (22 @ 10:07)  Wt(kg): --    PHYSICAL EXAM:  Gen: NAD, laying in bed, well appearing, alert, interactive  HEENT: DMM, anicteric sclera, no JVD, no cervical or supraclavicular LAD  Cardio: +S1/S2, RRR, no murmurs  Resp: breathing comfortably, speaking in full sentences, CTA b/l, no w/r/r  GI: soft, +BS x4, slight TTP in LLQ, RLQ. No CVA tenderness, rebound tenderness. Negative Foster's  Ext: no peripheral edema, NROM x4  Vasc: 2+ radial, DP, and PT pulses  Skin: warm, dry, and intact. No rashes, wounds or scars  Neuro: AAOx3, CN II-XII intact, no focal neurologic deficits    MEDICATIONS  (STANDING):  enoxaparin Injectable 40 milliGRAM(s) SubCutaneous every 24 hours  lactated ringers. 1000 milliLiter(s) (70 mL/Hr) IV Continuous <Continuous>  magnesium sulfate  IVPB 1 Gram(s) IV Intermittent once  melatonin 3 milliGRAM(s) Oral at bedtime  methylPREDNISolone sodium succinate Injectable 20 milliGRAM(s) IV Push every 8 hours    MEDICATIONS  (PRN):  acetaminophen     Tablet .. 650 milliGRAM(s) Oral every 6 hours PRN Temp greater or equal to 38C (100.4F), Mild Pain (1 - 3), Moderate Pain (4 - 6)  ondansetron    Tablet 4 milliGRAM(s) Oral every 8 hours PRN Nausea and/or Vomiting  traZODone 50 milliGRAM(s) Oral at bedtime PRN Insomnia    Allergies    amoxicillin (Stomach Upset)  erythromycin (Stomach Upset)    Intolerances      LABS:                        13.4   10.88 )-----------( 252      ( 17 May 2022 07:55 )             38.3         136  |  98  |  5<L>  ----------------------------<  116<H>  3.9   |  24  |  0.60    Ca    9.5      17 May 2022 07:55  Phos  3.9       Mg     1.7         TPro  x   /  Alb  x   /  TBili  0.6  /  DBili  x   /  AST  x   /  ALT  x   /  AlkPhos  x       PT/INR - ( 17 May 2022 07:55 )   PT: 13.6 sec;   INR: 1.14          PTT - ( 16 May 2022 06:25 )  PTT:32.5 sec  Urinalysis Basic - ( 16 May 2022 01:14 )    Color: Yellow / Appearance: Clear / S.020 / pH: x  Gluc: x / Ketone: >=80 mg/dL  / Bili: Negative / Urobili: 0.2 E.U./dL   Blood: x / Protein: NEGATIVE mg/dL / Nitrite: NEGATIVE   Leuk Esterase: NEGATIVE / RBC: < 5 /HPF / WBC < 5 /HPF   Sq Epi: x / Non Sq Epi: 0-5 /HPF / Bacteria: Present /HPF      CAPILLARY BLOOD GLUCOSE                     HOSPITAL COURSE: 27 yo F PMH of UC presenting w/ worsening bloody diarrhea (6-10 episodes per day), generalized abdominal pain, nausea, and one fever and chills x 1 week. Found to have leukocytosis and elevated inflammatory markers. CT A&P significant for inflammatory colitis. CDI and stool PCR negative. Started on IV Solumedrol 20mg q8h for treatment of ulcerative colitis flare. Underwent flexmoid sigmoidoscopy which revealed abnormal vascularity, erythema, exudate and erosions in the rectum and sigmoid colon compatible with Perry 2 score for ulcerative colitis. Awaiting pathology results. Tolerating regular diet with plan to discharge after short course of IV steroids with plan to transition to PO steroids on discharge.    INTERVAL HPI/OVERNIGHT EVENTS: JOHN.    SUBJECTIVE: Patient was seen and examined at bedside. Patient resting comfortably, s/p flex sig on . Pt reports minimal sleep and anxiety while trying to eat any food. Pt had 3 BMs since last night, reports they were dark green in color. No pain or other complaints.     VITALS:  T(F): 97.8 (22 @ 10:07)  HR: 78 (22 @ 10:07)  BP: 99/63 (22 @ 10:07)  RR: 18 (22 @ 10:07)  SpO2: 98% (22 @ 10:07)  Wt(kg): --    PHYSICAL EXAM:  Gen: NAD, laying in bed, well appearing, alert, interactive  HEENT: DMM, anicteric sclera, no JVD, no cervical or supraclavicular LAD  Cardio: +S1/S2, RRR, no murmurs  Resp: breathing comfortably, speaking in full sentences, CTA b/l, no w/r/r  GI: soft, +BS x4, slight TTP in LLQ, RLQ. No CVA tenderness, rebound tenderness. Negative Foster's  Ext: no peripheral edema, NROM x4  Vasc: 2+ radial, DP, and PT pulses  Skin: warm, dry, and intact. No rashes, wounds or scars  Neuro: AAOx3, CN II-XII intact, no focal neurologic deficits    MEDICATIONS  (STANDING):  enoxaparin Injectable 40 milliGRAM(s) SubCutaneous every 24 hours  lactated ringers. 1000 milliLiter(s) (70 mL/Hr) IV Continuous <Continuous>  magnesium sulfate  IVPB 1 Gram(s) IV Intermittent once  melatonin 3 milliGRAM(s) Oral at bedtime  methylPREDNISolone sodium succinate Injectable 20 milliGRAM(s) IV Push every 8 hours    MEDICATIONS  (PRN):  acetaminophen     Tablet .. 650 milliGRAM(s) Oral every 6 hours PRN Temp greater or equal to 38C (100.4F), Mild Pain (1 - 3), Moderate Pain (4 - 6)  ondansetron    Tablet 4 milliGRAM(s) Oral every 8 hours PRN Nausea and/or Vomiting  traZODone 50 milliGRAM(s) Oral at bedtime PRN Insomnia    Allergies    amoxicillin (Stomach Upset)  erythromycin (Stomach Upset)    Intolerances      LABS:                        13.4   10.88 )-----------( 252      ( 17 May 2022 07:55 )             38.3         136  |  98  |  5<L>  ----------------------------<  116<H>  3.9   |  24  |  0.60    Ca    9.5      17 May 2022 07:55  Phos  3.9       Mg     1.7         TPro  x   /  Alb  x   /  TBili  0.6  /  DBili  x   /  AST  x   /  ALT  x   /  AlkPhos  x       PT/INR - ( 17 May 2022 07:55 )   PT: 13.6 sec;   INR: 1.14          PTT - ( 16 May 2022 06:25 )  PTT:32.5 sec  Urinalysis Basic - ( 16 May 2022 01:14 )    Color: Yellow / Appearance: Clear / S.020 / pH: x  Gluc: x / Ketone: >=80 mg/dL  / Bili: Negative / Urobili: 0.2 E.U./dL   Blood: x / Protein: NEGATIVE mg/dL / Nitrite: NEGATIVE   Leuk Esterase: NEGATIVE / RBC: < 5 /HPF / WBC < 5 /HPF   Sq Epi: x / Non Sq Epi: 0-5 /HPF / Bacteria: Present /HPF      CAPILLARY BLOOD GLUCOSE

## 2022-05-17 NOTE — PROGRESS NOTE ADULT - SUBJECTIVE AND OBJECTIVE BOX
GASTROENTEROLOGY PROGRESS NOTE  Patient seen and examined at bedside. She is endorsing anxiety around eating, and complains of inability to sleep, which predated hospitalization but may be exacerbated by steroids. Reports improvement in abdominal discomfort, as well as less frequent bowel movements compared to the day prior. Denies fevers, chills, nausea, vomiting.    PERTINENT REVIEW OF SYSTEMS:  CONSTITUTIONAL: No weakness, fevers or chills  HEENT: No visual changes; No vertigo or throat pain   GASTROINTESTINAL: As above  NEUROLOGICAL: No numbness or weakness  SKIN: No itching, burning, rashes, or lesions     Allergies    amoxicillin (Stomach Upset)  erythromycin (Stomach Upset)    Intolerances      MEDICATIONS:  MEDICATIONS  (STANDING):  enoxaparin Injectable 40 milliGRAM(s) SubCutaneous every 24 hours  lactated ringers. 1000 milliLiter(s) (70 mL/Hr) IV Continuous <Continuous>  melatonin 3 milliGRAM(s) Oral at bedtime  methylPREDNISolone sodium succinate Injectable 20 milliGRAM(s) IV Push every 8 hours    MEDICATIONS  (PRN):  acetaminophen     Tablet .. 650 milliGRAM(s) Oral every 6 hours PRN Temp greater or equal to 38C (100.4F), Mild Pain (1 - 3), Moderate Pain (4 - 6)  ondansetron    Tablet 4 milliGRAM(s) Oral every 8 hours PRN Nausea and/or Vomiting  traZODone 50 milliGRAM(s) Oral at bedtime PRN Insomnia    Vital Signs Last 24 Hrs  T(C): 36.7 (17 May 2022 06:36), Max: 37.1 (16 May 2022 10:10)  T(F): 98.1 (17 May 2022 06:36), Max: 98.8 (16 May 2022 21:01)  HR: 68 (17 May 2022 06:36) (64 - 94)  BP: 105/67 (17 May 2022 06:36) (93/54 - 105/67)  BP(mean): --  RR: 18 (17 May 2022 06:36) (17 - 18)  SpO2: 98% (17 May 2022 06:36) (95% - 98%)    05-16 @ 07:01  -  05-17 @ 07:00  --------------------------------------------------------  IN: 1610 mL / OUT: 0 mL / NET: 1610 mL      PHYSICAL EXAM:    General: Thin, NAD  HEENT: MMM, conjunctiva and sclera clear  Gastrointestinal: Soft non-tender non-distended; No rebound or guarding  Skin: Warm and dry. No obvious rash    LABS:                        13.4   10.88 )-----------( 252      ( 17 May 2022 07:55 )             38.3         136  |  98  |  5<L>  ----------------------------<  116<H>  3.9   |  24  |  0.60    Ca    9.5      17 May 2022 07:55  Phos  3.9       Mg     1.7         TPro  x   /  Alb  x   /  TBili  0.6  /  DBili  x   /  AST  x   /  ALT  x   /  AlkPhos  x       PT/INR - ( 17 May 2022 07:55 )   PT: 13.6 sec;   INR: 1.14          PTT - ( 16 May 2022 06:25 )  PTT:32.5 sec      Urinalysis Basic - ( 16 May 2022 01:14 )    Color: Yellow / Appearance: Clear / S.020 / pH: x  Gluc: x / Ketone: >=80 mg/dL  / Bili: Negative / Urobili: 0.2 E.U./dL   Blood: x / Protein: NEGATIVE mg/dL / Nitrite: NEGATIVE   Leuk Esterase: NEGATIVE / RBC: < 5 /HPF / WBC < 5 /HPF   Sq Epi: x / Non Sq Epi: 0-5 /HPF / Bacteria: Present /HPF                GI PCR Panel, Stool (collected 16 May 2022 10:56)  Source: .Stool Feces  Final Report (16 May 2022 11:46):    GI PCR Results: NOT detected    *******Please Note:*******    GI panel PCR evaluates for:    Campylobacter, Plesiomonas shigelloides, Salmonella,    Vibrio, Yersinia enterocolitica, Enteroaggregative    Escherichia coli (EAEC), Enteropathogenic E.coli (EPEC),    Enterotoxigenic E. coli (ETEC) lt/st, Shiga-like    toxin-producing E. coli (STEC) stx1/stx2,    Shigella/ Enteroinvasive E. coli (EIEC), Cryptosporidium,    Cyclospora cayetanensis, Entamoeba histolytica,    Giardia lamblia, Adenovirus F 40/41, Astrovirus,    Norovirus GI/GII, Rotavirus A, Sapovirus    Culture - Urine (collected 16 May 2022 09:52)  Source: Clean Catch Clean Catch (Midstream)  Final Report (17 May 2022 09:02):    No growth    Culture - Blood (collected 15 May 2022 18:58)  Source: .Blood Blood-Peripheral  Preliminary Report (16 May 2022 19:02):    No growth to date.    Culture - Blood (collected 15 May 2022 18:58)  Source: .Blood Blood-Peripheral  Preliminary Report (16 May 2022 19:02):    No growth to date.      RADIOLOGY & ADDITIONAL STUDIES:  Reviewed

## 2022-05-17 NOTE — PROGRESS NOTE ADULT - PROBLEM SELECTOR PLAN 5
- C/w Zofran PRN for nausea   - QTC on admission 433 - continue to monitor
- C/w Zofran PRN for nausea   - QTC on admission 433 - continue to monitor

## 2022-05-18 ENCOUNTER — TRANSCRIPTION ENCOUNTER (OUTPATIENT)
Age: 27
End: 2022-05-18

## 2022-05-18 VITALS
RESPIRATION RATE: 16 BRPM | DIASTOLIC BLOOD PRESSURE: 69 MMHG | TEMPERATURE: 98 F | SYSTOLIC BLOOD PRESSURE: 101 MMHG | HEART RATE: 72 BPM | OXYGEN SATURATION: 96 %

## 2022-05-18 LAB
ANION GAP SERPL CALC-SCNC: 11 MMOL/L — SIGNIFICANT CHANGE UP (ref 5–17)
BASOPHILS # BLD AUTO: 0.01 K/UL — SIGNIFICANT CHANGE UP (ref 0–0.2)
BASOPHILS NFR BLD AUTO: 0.1 % — SIGNIFICANT CHANGE UP (ref 0–2)
BUN SERPL-MCNC: 10 MG/DL — SIGNIFICANT CHANGE UP (ref 7–23)
CALCIUM SERPL-MCNC: 9.6 MG/DL — SIGNIFICANT CHANGE UP (ref 8.4–10.5)
CHLORIDE SERPL-SCNC: 99 MMOL/L — SIGNIFICANT CHANGE UP (ref 96–108)
CO2 SERPL-SCNC: 27 MMOL/L — SIGNIFICANT CHANGE UP (ref 22–31)
CREAT SERPL-MCNC: 0.6 MG/DL — SIGNIFICANT CHANGE UP (ref 0.5–1.3)
CRP SERPL-MCNC: 21.7 MG/L — HIGH (ref 0–4)
CULTURE RESULTS: SIGNIFICANT CHANGE UP
EGFR: 126 ML/MIN/1.73M2 — SIGNIFICANT CHANGE UP
EOSINOPHIL # BLD AUTO: 0 K/UL — SIGNIFICANT CHANGE UP (ref 0–0.5)
EOSINOPHIL NFR BLD AUTO: 0 % — SIGNIFICANT CHANGE UP (ref 0–6)
GLUCOSE SERPL-MCNC: 119 MG/DL — HIGH (ref 70–99)
HBV SURFACE AB SER-ACNC: SIGNIFICANT CHANGE UP
HCT VFR BLD CALC: 39.4 % — SIGNIFICANT CHANGE UP (ref 34.5–45)
HGB BLD-MCNC: 13.5 G/DL — SIGNIFICANT CHANGE UP (ref 11.5–15.5)
IMM GRANULOCYTES NFR BLD AUTO: 0.8 % — SIGNIFICANT CHANGE UP (ref 0–1.5)
LYMPHOCYTES # BLD AUTO: 1.23 K/UL — SIGNIFICANT CHANGE UP (ref 1–3.3)
LYMPHOCYTES # BLD AUTO: 14.4 % — SIGNIFICANT CHANGE UP (ref 13–44)
MAGNESIUM SERPL-MCNC: 2.1 MG/DL — SIGNIFICANT CHANGE UP (ref 1.6–2.6)
MCHC RBC-ENTMCNC: 32.5 PG — SIGNIFICANT CHANGE UP (ref 27–34)
MCHC RBC-ENTMCNC: 34.3 GM/DL — SIGNIFICANT CHANGE UP (ref 32–36)
MCV RBC AUTO: 94.7 FL — SIGNIFICANT CHANGE UP (ref 80–100)
MONOCYTES # BLD AUTO: 0.56 K/UL — SIGNIFICANT CHANGE UP (ref 0–0.9)
MONOCYTES NFR BLD AUTO: 6.6 % — SIGNIFICANT CHANGE UP (ref 2–14)
NEUTROPHILS # BLD AUTO: 6.66 K/UL — SIGNIFICANT CHANGE UP (ref 1.8–7.4)
NEUTROPHILS NFR BLD AUTO: 78.1 % — HIGH (ref 43–77)
NRBC # BLD: 0 /100 WBCS — SIGNIFICANT CHANGE UP (ref 0–0)
PHOSPHATE SERPL-MCNC: 3.6 MG/DL — SIGNIFICANT CHANGE UP (ref 2.5–4.5)
PLATELET # BLD AUTO: 270 K/UL — SIGNIFICANT CHANGE UP (ref 150–400)
POTASSIUM SERPL-MCNC: 3.9 MMOL/L — SIGNIFICANT CHANGE UP (ref 3.5–5.3)
POTASSIUM SERPL-SCNC: 3.9 MMOL/L — SIGNIFICANT CHANGE UP (ref 3.5–5.3)
RBC # BLD: 4.16 M/UL — SIGNIFICANT CHANGE UP (ref 3.8–5.2)
RBC # FLD: 11.8 % — SIGNIFICANT CHANGE UP (ref 10.3–14.5)
SODIUM SERPL-SCNC: 137 MMOL/L — SIGNIFICANT CHANGE UP (ref 135–145)
SPECIMEN SOURCE: SIGNIFICANT CHANGE UP
SURGICAL PATHOLOGY STUDY: SIGNIFICANT CHANGE UP
WBC # BLD: 8.53 K/UL — SIGNIFICANT CHANGE UP (ref 3.8–10.5)
WBC # FLD AUTO: 8.53 K/UL — SIGNIFICANT CHANGE UP (ref 3.8–10.5)

## 2022-05-18 PROCEDURE — 99232 SBSQ HOSP IP/OBS MODERATE 35: CPT

## 2022-05-18 PROCEDURE — 99239 HOSP IP/OBS DSCHRG MGMT >30: CPT | Mod: GC

## 2022-05-18 RX ORDER — ALPRAZOLAM 0.25 MG
1 TABLET ORAL
Qty: 7 | Refills: 0
Start: 2022-05-18 | End: 2022-05-24

## 2022-05-18 RX ORDER — PANTOPRAZOLE SODIUM 20 MG/1
1 TABLET, DELAYED RELEASE ORAL
Qty: 90 | Refills: 0
Start: 2022-05-18 | End: 2022-08-15

## 2022-05-18 RX ORDER — ALPRAZOLAM 0.25 MG
0.5 TABLET ORAL ONCE
Refills: 0 | Status: DISCONTINUED | OUTPATIENT
Start: 2022-05-18 | End: 2022-05-18

## 2022-05-18 RX ADMIN — Medication 20 MILLIGRAM(S): at 05:59

## 2022-05-18 RX ADMIN — Medication 1 TABLET(S): at 12:37

## 2022-05-18 RX ADMIN — Medication 20 MILLIGRAM(S): at 13:47

## 2022-05-18 NOTE — DISCHARGE NOTE PROVIDER - NSDCCPCAREPLAN_GEN_ALL_CORE_FT
PRINCIPAL DISCHARGE DIAGNOSIS  Diagnosis: Pancolitis  Assessment and Plan of Treatment: You were admitted to [facility] with pancolitis. This is a condition of inflamation of the colon. In your case, this was likely due to a flair of your known Ulcerative Colitis. For this you were seen by gastroenterology while in the hospital and a flexible sigmoidoscopy was performed. This is a direct visualization test in which a camera examines your intestines. You were treated with steroids which can control inflamation.   Following your discharge, please take the prescribed steroids. You were prescribed prednisone with the following dose schedule.  Please take 60 mg for 7 days.  For the following 5 days, take 55 mg by taking 5 10 mg tabs and splitting an additional tab in half.  For the 5 days after that, take 50 mg per day for 5 days.  Continue this schedule of dosing of the prednisone until you reach no medications. This will control the inflamation in your colon and limit the risk of an additional flair. In addition, please follow up with Dr. Coleman, a gastroenterologist who will reach out to you after your discharge.

## 2022-05-18 NOTE — PROGRESS NOTE ADULT - ASSESSMENT
26yF w/Hx of UC pancolitis off all medications for ~10 years, presenting with one week of bloody diarrhea, generalized abdominal pain, nausea, fevers, and chills. GI consulted for concern for UC flare.    Bloody diarrhea - Patient with history of UC Pancolitis, history of C diff infection. Reports prior exposures to 5-ASA derivatives, 6MP, and intermittent steroids through course of disease. Last colonoscopy >10 years ago during which she reported a "traumatic experience" and has not wanted a repeat despite elevated colorectal cancer risk. Symptoms similar to prior flares which have been responsive to steroids in the past. Endorses weight loss over the course of the past month, but cannot quantify. Endorses family history of IBD in sister who was diagnosed with Crohn's 4 years ago. Presentation likely reflects a moderate to severe UC flare. Patient s/p flexible sigmoidoscopy on 5/16 with Perry 2 colitis.  - Follow-up pathology results  - Continue Solumedrol IV 20mg Q8H  - Calcium, Vitamin D, and PPI while on steroids  - Obtain Hepatitis B surface antibody to ascertain status of immunity  - Follow-up Quantiferon  - Trend CRP  - Continue pharmacologic DVT ppx  - Regular diet as tolerated    Recommendations discussed with primary team  Case discussed with attending physician
26yF w/Hx of UC pancolitis off all medications for ~10 years, presenting with one week of bloody diarrhea, generalized abdominal pain, nausea, fevers, and chills. GI consulted for concern for UC flare.    Bloody diarrhea - Patient with history of UC Pancolitis, history of C diff infection. Reports prior exposures to 5-ASA derivatives, 6MP, and intermittent steroids through course of disease. Last colonoscopy >10 years ago during which she reported a "traumatic experience" and has not wanted a repeat despite elevated colorectal cancer risk. Symptoms similar to prior flares which have been responsive to steroids in the past. Endorses weight loss over the course of the past month, but cannot quantify. Endorses family history of IBD in sister who was diagnosed with Crohn's 4 years ago. Presentation likely reflects a moderate to severe UC flare. Patient s/p flexible sigmoidoscopy on 5/16 with Perry 2 colitis.  - Follow-up pathology results  - Transition to Prednisone 60mg QD  - Calcium, Vitamin D, and PPI while on steroids  - Continue pharmacologic DVT ppx  - Regular diet as tolerated  - Please schedule appointment for patient to follow-up with Dr. Coleman after their discharge at [phone]    Recommendations discussed with primary team  Case discussed with attending physician
Patient is a 25 yo F, PMH of ulcerative colitis (not on meds since 2011), presenting w/ one week of worsening bloody diarrhea (6-10 episodes per day), generalized abdominal pain, nausea, and one fever and chills, found to have inflammatory pancolitis on CTAP, and admitted for further work-up with GI. S/p flex sigmoidoscopy on 5/13 showing Perry II inflammation.
Patient is a 25 yo F, PMH of ulcerative colitis (not on meds since 2011), presenting w/ one week of worsening bloody diarrhea (6-10 episodes per day), generalized abdominal pain, nausea, and one fever and chills, found to have inflammatory pancolitis on CTAP, and admitted for further work-up with GI.
Patient is a 27 yo F, PMH of ulcerative colitis (not on meds since 2011), presenting w/ one week of worsening bloody diarrhea (6-10 episodes per day), generalized abdominal pain, nausea, and one fever and chills, found to have inflammatory pancolitis on CTAP, and admitted for further work-up with GI.

## 2022-05-18 NOTE — PROGRESS NOTE ADULT - SUBJECTIVE AND OBJECTIVE BOX
GASTROENTEROLOGY PROGRESS NOTE  Patient seen and examined at bedside. Reports continued improvement today - less frequent bowel movements, more formed. No blood. Denies fevers, chills.    PERTINENT REVIEW OF SYSTEMS:  CONSTITUTIONAL: No weakness, fevers or chills  HEENT: No visual changes; No vertigo or throat pain   GASTROINTESTINAL: As above  NEUROLOGICAL: No numbness or weakness  SKIN: No itching, burning, rashes, or lesions     Allergies    amoxicillin (Stomach Upset)  erythromycin (Stomach Upset)    Intolerances      MEDICATIONS:  MEDICATIONS  (STANDING):  calcium carbonate 1250 mG  + Vitamin D (OsCal 500 + D) 1 Tablet(s) Oral daily  enoxaparin Injectable 40 milliGRAM(s) SubCutaneous every 24 hours  lactated ringers. 1000 milliLiter(s) (70 mL/Hr) IV Continuous <Continuous>  melatonin 3 milliGRAM(s) Oral at bedtime  methylPREDNISolone sodium succinate Injectable 20 milliGRAM(s) IV Push every 8 hours  traZODone 25 milliGRAM(s) Oral at bedtime    MEDICATIONS  (PRN):  acetaminophen     Tablet .. 650 milliGRAM(s) Oral every 6 hours PRN Temp greater or equal to 38C (100.4F), Mild Pain (1 - 3), Moderate Pain (4 - 6)  ondansetron    Tablet 4 milliGRAM(s) Oral every 8 hours PRN Nausea and/or Vomiting    Vital Signs Last 24 Hrs  T(C): 36.7 (18 May 2022 08:50), Max: 36.7 (18 May 2022 08:50)  T(F): 98 (18 May 2022 08:50), Max: 98 (18 May 2022 08:50)  HR: 72 (18 May 2022 08:50) (68 - 78)  BP: 101/69 (18 May 2022 08:50) (98/62 - 113/68)  BP(mean): --  RR: 16 (18 May 2022 08:50) (16 - 18)  SpO2: 96% (18 May 2022 08:50) (96% - 98%)    PHYSICAL EXAM:    General: Well developed; well nourished; in no acute distress  HEENT: MMM, conjunctiva and sclera clear  Gastrointestinal: Soft non-tender non-distended; Normal bowel sounds; No hepatosplenomegaly. No rebound or guarding  Skin: Warm and dry. No obvious rash    LABS:                        13.5   8.53  )-----------( 270      ( 18 May 2022 07:02 )             39.4     05-18    137  |  99  |  10  ----------------------------<  119<H>  3.9   |  27  |  0.60    Ca    9.6      18 May 2022 07:02  Phos  3.6     05-18  Mg     2.1     05-18    TPro  x   /  Alb  x   /  TBili  0.6  /  DBili  x   /  AST  x   /  ALT  x   /  AlkPhos  x   05-17    PT/INR - ( 17 May 2022 07:55 )   PT: 13.6 sec;   INR: 1.14                            GI PCR Panel, Stool (collected 16 May 2022 10:56)  Source: .Stool Feces  Final Report (16 May 2022 11:46):    GI PCR Results: NOT detected    *******Please Note:*******    GI panel PCR evaluates for:    Campylobacter, Plesiomonas shigelloides, Salmonella,    Vibrio, Yersinia enterocolitica, Enteroaggregative    Escherichia coli (EAEC), Enteropathogenic E.coli (EPEC),    Enterotoxigenic E. coli (ETEC) lt/st, Shiga-like    toxin-producing E. coli (STEC) stx1/stx2,    Shigella/ Enteroinvasive E. coli (EIEC), Cryptosporidium,    Cyclospora cayetanensis, Entamoeba histolytica,    Giardia lamblia, Adenovirus F 40/41, Astrovirus,    Norovirus GI/GII, Rotavirus A, Sapovirus    Culture - Urine (collected 16 May 2022 09:52)  Source: Clean Catch Clean Catch (Midstream)  Final Report (17 May 2022 09:02):    No growth    Culture - Blood (collected 15 May 2022 18:58)  Source: .Blood Blood-Peripheral  Preliminary Report (16 May 2022 19:02):    No growth to date.    Culture - Blood (collected 15 May 2022 18:58)  Source: .Blood Blood-Peripheral  Preliminary Report (16 May 2022 19:02):    No growth to date.      RADIOLOGY & ADDITIONAL STUDIES:  Reviewed

## 2022-05-18 NOTE — DISCHARGE NOTE PROVIDER - NSDCMRMEDTOKEN_GEN_ALL_CORE_FT
pantoprazole 40 mg oral granule, delayed release: 1 each orally once a day   predniSONE 10 mg oral tablet: 6 tab(s) orally once a day MDD:To continue steroid taper as described. Take 60 mg for 7 days, then 55 mg for 5 days, then 50 mg for 5 days and so on until all doses are given.   Attending calcium-vitamin D 500 mg-5 mcg (200 intl units) oral tablet: 1 tab(s) orally once a day  pantoprazole 40 mg oral granule, delayed release: 1 each orally once a day   predniSONE 10 mg oral tablet: 6 tab(s) orally once a day MDD:To continue steroid taper as described. Take 60 mg for 7 days, then 55 mg for 5 days, then 50 mg for 5 days and so on until all doses are given.  Xanax 0.25 mg oral tablet: 1 tab(s) orally once a day (at bedtime), As Needed -for agitation - for anxiety MDD:1tab   calcium-vitamin D 500 mg-5 mcg (200 intl units) oral tablet: 1 tab(s) orally once a day  pantoprazole 40 mg oral granule, delayed release: 1 each orally once a day   predniSONE 10 mg oral tablet: 6 tab(s) orally once a day MDD:To continue steroid taper as described. Take 60 mg for 7 days, then 55 mg for 5 days, then 50 mg for 5 days and so on until all doses are given.  predniSONE 20 mg oral tablet: 1 tab(s) orally once a day   Xanax 0.25 mg oral tablet: 1 tab(s) orally once a day (at bedtime), As Needed -for agitation - for anxiety MDD:1tab

## 2022-05-18 NOTE — DISCHARGE NOTE PROVIDER - HOSPITAL COURSE
#Discharge: do not delete    21F PMH Ulcerative Colitis presents with abdominal pain and found to have IBD flair    Problem List/Main Diagnoses (system-based):   1. UC - Patient presented with UC flair. GI consulted on admission and flex sig performed. Patients started on steroids and pantoprazole and discharged for outpatient management  . Inpatient treatment course.    New medications: Prednisone, pantoprazole 40 mg PO Qd, zanax 0.25 mg x7 tabs  Labs to be followed outpatient: None  Exam to be followed outpatient: None   #Discharge: do not delete    21F PMH Ulcerative Colitis presents with abdominal pain and found to have IBD flair    Problem List/Main Diagnoses (system-based):   1. UC - Patient presented with UC flair. GI consulted on admission and flex sig performed. Patients started on steroids and pantoprazole and discharged for outpatient management  . Inpatient treatment course.    New medications: Prednisone, pantoprazole 40 mg PO Qd, xanax 0.25 mg x7 tabs  Labs to be followed outpatient: None  Exam to be followed outpatient: None    ******************************************************  ATTENDING ATTESTATION    I have read and agree with the resident Discharge Note above. Patient seen and discussed with resident team on the day of discharge.     # Ulcerative colitis  - f/u with GI for colonoscopy; discharging on slow taper of prednisone , scripts sent to [facility]. ; PPI + Calcium + Vitamin D while on high dose steroids   # Sleep disturbance, anxiety - Sleep worsened since arriving to hospital and starting steroids. Did not respond to melatonin inpatient. Did not want trazodone due to side effect profile. Said that she has used xanax in the past with favorable effect. Anticipate some worsening in sleep disturbance while on high dose steroids. Short course of low dose xanax prescribed to be taken PRN. Advised to minimize use of benzodiazepine if able given effect of benzo on sleep quality / restorative sleep. Establish care with new PCP, if sleep disturbance  persists, and it appears that patient will require sleep aids long term, could trial non-benzo (e.g. low dose doxepin [silenor])   # SIRS + ; met 2/4 SIRS criteria on admission. however, no infectious cause found. Likely SIRS+ due to ulcerative colitis flare and diarrhea.     Physical Exam:  T(C): 36.7  HR: 72  BP: 101/69  RR: 16  SpO2: 96%    Gen: sitting upright in bed at time of exam  HEENT: NCAT, MMM, clear OP  Neck: supple, trachea at midline  CV: RRR, +S1/S2  Pulm: adequate respiratory effort, no increased work of breathing  Abd: soft, NTND  Skin: warm and dry, no new rashes vs prior report  Ext: WWP, no LE edema  Neuro: AOx3, no gross focal neurological deficits  Psych: affect and behavior appropriate, pleasant.     I was physically present for the evaluation and management services provided. I agree with the included history, physical, and plan which I reviewed and edited where appropriate. I spent > 30 minutes on direct patient care and discharge planning with more than 50% of the visit spent on counseling and/or coordination of care.

## 2022-05-18 NOTE — DISCHARGE NOTE PROVIDER - NSDCFUSCHEDAPPT_GEN_ALL_CORE_FT
[facility]  INTMED [address]  Scheduled Appointment: 05/24/2022     [facility]  INTMED [address]  Scheduled Appointment: 05/24/2022    Ramona Coleman  [facility]  GASTRO [address]  Scheduled Appointment: 05/27/2022

## 2022-05-18 NOTE — DISCHARGE NOTE NURSING/CASE MANAGEMENT/SOCIAL WORK - PATIENT PORTAL LINK FT
You can access the FollowMyHealth Patient Portal offered by [facility] by registering at the following website: [email or web address]. By joining [employer]’s FollowMyHealth portal, you will also be able to view your health information using other applications (apps) compatible with our system.

## 2022-05-18 NOTE — PROGRESS NOTE ADULT - SUBJECTIVE AND OBJECTIVE BOX
CC: Patient is a 26y old  Female who presents with a chief complaint of     INTERVAL EVENTS: EMETERIO    SUBJECTIVE / INTERVAL HPI: Patient seen and examined at bedside.     ROS: negative unless otherwise stated above.    VITAL SIGNS:  Vital Signs Last 24 Hrs  T(C): 36.6 (18 May 2022 05:51), Max: 36.6 (17 May 2022 10:07)  T(F): 97.9 (18 May 2022 05:51), Max: 97.9 (18 May 2022 05:51)  HR: 69 (18 May 2022 05:51) (68 - 78)  BP: 101/64 (18 May 2022 05:51) (98/62 - 113/68)  BP(mean): --  RR: 17 (18 May 2022 05:51) (17 - 18)  SpO2: 97% (18 May 2022 05:51) (97% - 98%)        PHYSICAL EXAM:    General: NAD  HEENT: MMM  Neck: supple  Cardiovascular: +S1/S2; RRR  Respiratory: CTA B/L; no W/R/R  Gastrointestinal: soft, NT/ND  Extremities: WWP; no edema, clubbing or cyanosis  Vascular: 2+ radial, DP/PT pulses B/L  Neurological: AAOx3; no focal deficits    MEDICATIONS:  MEDICATIONS  (STANDING):  calcium carbonate 1250 mG  + Vitamin D (OsCal 500 + D) 1 Tablet(s) Oral daily  enoxaparin Injectable 40 milliGRAM(s) SubCutaneous every 24 hours  lactated ringers. 1000 milliLiter(s) (70 mL/Hr) IV Continuous <Continuous>  melatonin 3 milliGRAM(s) Oral at bedtime  methylPREDNISolone sodium succinate Injectable 20 milliGRAM(s) IV Push every 8 hours  traZODone 25 milliGRAM(s) Oral at bedtime    MEDICATIONS  (PRN):  acetaminophen     Tablet .. 650 milliGRAM(s) Oral every 6 hours PRN Temp greater or equal to 38C (100.4F), Mild Pain (1 - 3), Moderate Pain (4 - 6)  ondansetron    Tablet 4 milliGRAM(s) Oral every 8 hours PRN Nausea and/or Vomiting      ALLERGIES:  Allergies    amoxicillin (Stomach Upset)  erythromycin (Stomach Upset)    Intolerances        LABS:                        13.4   10.88 )-----------( 252      ( 17 May 2022 07:55 )             38.3     05-17    136  |  98  |  5<L>  ----------------------------<  116<H>  3.9   |  24  |  0.60    Ca    9.5      17 May 2022 07:55  Phos  3.9     05-17  Mg     1.7     05-17    TPro  x   /  Alb  x   /  TBili  0.6  /  DBili  x   /  AST  x   /  ALT  x   /  AlkPhos  x   05-17    PT/INR - ( 17 May 2022 07:55 )   PT: 13.6 sec;   INR: 1.14              CAPILLARY BLOOD GLUCOSE          RADIOLOGY & ADDITIONAL TESTS: Reviewed. CC: Patient is a 26y old  Female who presents with a chief complaint of     INTERVAL EVENTS: EMETERIO    SUBJECTIVE / INTERVAL HPI: Patient seen and examined at bedside.     ROS: negative unless otherwise stated above.    VITAL SIGNS:  Vital Signs Last 24 Hrs  T(C): 36.6 (18 May 2022 05:51), Max: 36.6 (17 May 2022 10:07)  T(F): 97.9 (18 May 2022 05:51), Max: 97.9 (18 May 2022 05:51)  HR: 69 (18 May 2022 05:51) (68 - 78)  BP: 101/64 (18 May 2022 05:51) (98/62 - 113/68)  BP(mean): --  RR: 17 (18 May 2022 05:51) (17 - 18)  SpO2: 97% (18 May 2022 05:51) (97% - 98%)        PHYSICAL EXAM:    General: NAD  HEENT: MMM  Neck: supple  Cardiovascular: +S1/S2; RRR  Respiratory: CTA B/L; no W/R/R  Gastrointestinal: soft, NT/ND  Extremities: WWP; no edema, clubbing or cyanosis  Vascular: 2+ radial, DP/PT pulses B/L  Neurological: AAOx3; no focal deficits    MEDICATIONS:  MEDICATIONS  (STANDING):  calcium carbonate 1250 mG  + Vitamin D (OsCal 500 + D) 1 Tablet(s) Oral daily  enoxaparin Injectable 40 milliGRAM(s) SubCutaneous every 24 hours  lactated ringers. 1000 milliLiter(s) (70 mL/Hr) IV Continuous <Continuous>  melatonin 3 milliGRAM(s) Oral at bedtime  methylPREDNISolone sodium succinate Injectable 20 milliGRAM(s) IV Push every 8 hours  traZODone 25 milliGRAM(s) Oral at bedtime    MEDICATIONS  (PRN):  acetaminophen     Tablet .. 650 milliGRAM(s) Oral every 6 hours PRN Temp greater or equal to 38C (100.4F), Mild Pain (1 - 3), Moderate Pain (4 - 6)  ondansetron    Tablet 4 milliGRAM(s) Oral every 8 hours PRN Nausea and/or Vomiting      ALLERGIES:  Allergies    amoxicillin (Stomach Upset)  erythromycin (Stomach Upset)    Intolerances        LABS:                        13.5   8.53 )-----------( 270      ( 18 May 2022 07:02 )             39.4     05-17    136  |  98  |  5<L>  ----------------------------<  116<H>  3.9   |  24  |  0.60    Ca    9.5      17 May 2022 07:55  Phos  3.9     05-17  Mg     1.7     05-17    TPro  x   /  Alb  x   /  TBili  0.6  /  DBili  x   /  AST  x   /  ALT  x   /  AlkPhos  x   05-17    PT/INR - ( 17 May 2022 07:55 )   PT: 13.6 sec;   INR: 1.14              CAPILLARY BLOOD GLUCOSE          RADIOLOGY & ADDITIONAL TESTS: Reviewed. CC: Patient is a 26y old F w PMH of UC (not on meds since 2011), presenting w/ one week of worsening bloody diarrhea (6-10 episodes per day), generalized abdominal pain, nausea, and fever and chills x 1 day.    INTERVAL EVENTS: JOHN    SUBJECTIVE / INTERVAL HPI: Patient seen and examined at bedside. Pt sitting up in bed, resting comfortably. Reported poor sleep again last night--did not take trazodone b/c fear of nightmare side effects. Reported one bowel movement this morning, more formed and non-bloody. Also has increasing appetite, feels good enough to leave.    ROS: negative unless otherwise stated above.    VITAL SIGNS:  Vital Signs Last 24 Hrs  T(C): 36.6 (18 May 2022 05:51), Max: 36.6 (17 May 2022 10:07)  T(F): 97.9 (18 May 2022 05:51), Max: 97.9 (18 May 2022 05:51)  HR: 69 (18 May 2022 05:51) (68 - 78)  BP: 101/64 (18 May 2022 05:51) (98/62 - 113/68)  BP(mean): --  RR: 17 (18 May 2022 05:51) (17 - 18)  SpO2: 97% (18 May 2022 05:51) (97% - 98%)        PHYSICAL EXAM:    General: NAD  HEENT: MMM  Neck: supple  Cardiovascular: +S1/S2; RRR  Respiratory: CTA B/L; no W/R/R  Gastrointestinal: soft, NT/ND, BS present, no rebound tenderness or guarding  Extremities: WWP; no edema, clubbing or cyanosis  Vascular: 2+ radial, DP/PT pulses B/L  Neurological: AAOx3; no focal deficits    MEDICATIONS:  MEDICATIONS  (STANDING):  calcium carbonate 1250 mG  + Vitamin D (OsCal 500 + D) 1 Tablet(s) Oral daily  enoxaparin Injectable 40 milliGRAM(s) SubCutaneous every 24 hours  lactated ringers. 1000 milliLiter(s) (70 mL/Hr) IV Continuous <Continuous>  melatonin 3 milliGRAM(s) Oral at bedtime  methylPREDNISolone sodium succinate Injectable 20 milliGRAM(s) IV Push every 8 hours  traZODone 25 milliGRAM(s) Oral at bedtime    MEDICATIONS  (PRN):  acetaminophen     Tablet .. 650 milliGRAM(s) Oral every 6 hours PRN Temp greater or equal to 38C (100.4F), Mild Pain (1 - 3), Moderate Pain (4 - 6)  ondansetron    Tablet 4 milliGRAM(s) Oral every 8 hours PRN Nausea and/or Vomiting      ALLERGIES:  Allergies    amoxicillin (Stomach Upset)  erythromycin (Stomach Upset)    Intolerances        LABS:                        13.5   8.53 )-----------( 270      ( 18 May 2022 07:02 )             39.4     05-17    136  |  98  |  5<L>  ----------------------------<  116<H>  3.9   |  24  |  0.60    Ca    9.5      17 May 2022 07:55  Phos  3.9     05-17  Mg     1.7     05-17    TPro  x   /  Alb  x   /  TBili  0.6  /  DBili  x   /  AST  x   /  ALT  x   /  AlkPhos  x   05-17    PT/INR - ( 17 May 2022 07:55 )   PT: 13.6 sec;   INR: 1.14              CAPILLARY BLOOD GLUCOSE          RADIOLOGY & ADDITIONAL TESTS: Reviewed.

## 2022-05-18 NOTE — PROGRESS NOTE ADULT - ATTENDING COMMENTS
Continues to report improvement, only has 2 BM today morning, no blood, consistency improving, CRP decreased to 21, also tolerating PO diet   Steroid responsive ulcerative colitis - recommend to continue steroids at 60 mg to complete 7 days and then taper by 5 mg / 5 days, continue to monitor symptoms  Outpatient appointment with IBD team to discuss further management, patient wishes to establish care with our team   Advised to take Ca/Vit D and PPI while on steroids  Encouraged PO intake  Patient and mother verbalized understanding and agreed with plan, advised to come back to ER if she starts experiencing recurrence of symptoms.   Rest plan as per fellow note

## 2022-05-18 NOTE — DISCHARGE NOTE PROVIDER - CARE PROVIDER_API CALL
Ramona Coleman)  Gastroenterology; Internal Medicine  [address] [place] [address]  Phone: [phone]  Fax: [phone]  Follow Up Time:

## 2022-05-20 LAB
CULTURE RESULTS: SIGNIFICANT CHANGE UP
CULTURE RESULTS: SIGNIFICANT CHANGE UP
SPECIMEN SOURCE: SIGNIFICANT CHANGE UP
SPECIMEN SOURCE: SIGNIFICANT CHANGE UP

## 2022-05-24 ENCOUNTER — APPOINTMENT (OUTPATIENT)
Dept: INTERNAL MEDICINE | Facility: CLINIC | Age: 27
End: 2022-05-24

## 2022-05-27 ENCOUNTER — APPOINTMENT (OUTPATIENT)
Dept: GASTROENTEROLOGY | Facility: CLINIC | Age: 27
End: 2022-05-27
Payer: MEDICAID

## 2022-05-27 VITALS
WEIGHT: 110 LBS | OXYGEN SATURATION: 97 % | BODY MASS INDEX: 17.68 KG/M2 | RESPIRATION RATE: 14 BRPM | HEART RATE: 85 BPM | DIASTOLIC BLOOD PRESSURE: 60 MMHG | HEIGHT: 66 IN | SYSTOLIC BLOOD PRESSURE: 120 MMHG | TEMPERATURE: 97.2 F

## 2022-05-27 PROCEDURE — 99203 OFFICE O/P NEW LOW 30 MIN: CPT

## 2022-05-31 DIAGNOSIS — R65.10 SYSTEMIC INFLAMMATORY RESPONSE SYNDROME (SIRS) OF NON-INFECTIOUS ORIGIN WITHOUT ACUTE ORGAN DYSFUNCTION: ICD-10-CM

## 2022-05-31 DIAGNOSIS — G47.00 INSOMNIA, UNSPECIFIED: ICD-10-CM

## 2022-05-31 DIAGNOSIS — R10.9 UNSPECIFIED ABDOMINAL PAIN: ICD-10-CM

## 2022-05-31 DIAGNOSIS — E03.9 HYPOTHYROIDISM, UNSPECIFIED: ICD-10-CM

## 2022-05-31 DIAGNOSIS — K52.9 NONINFECTIVE GASTROENTERITIS AND COLITIS, UNSPECIFIED: ICD-10-CM

## 2022-05-31 DIAGNOSIS — F12.10 CANNABIS ABUSE, UNCOMPLICATED: ICD-10-CM

## 2022-05-31 DIAGNOSIS — K51.90 ULCERATIVE COLITIS, UNSPECIFIED, WITHOUT COMPLICATIONS: ICD-10-CM

## 2022-05-31 DIAGNOSIS — F41.9 ANXIETY DISORDER, UNSPECIFIED: ICD-10-CM

## 2022-05-31 DIAGNOSIS — D18.09 HEMANGIOMA OF OTHER SITES: ICD-10-CM

## 2022-06-01 ENCOUNTER — NON-APPOINTMENT (OUTPATIENT)
Age: 27
End: 2022-06-01

## 2022-06-03 ENCOUNTER — NON-APPOINTMENT (OUTPATIENT)
Age: 27
End: 2022-06-03

## 2022-06-07 RX ORDER — MESALAMINE 800 MG/1
800 TABLET, DELAYED RELEASE ORAL 3 TIMES DAILY
Qty: 180 | Refills: 0 | Status: DISCONTINUED | COMMUNITY
Start: 2022-06-03 | End: 2022-06-07

## 2022-06-20 ENCOUNTER — APPOINTMENT (OUTPATIENT)
Dept: GASTROENTEROLOGY | Facility: CLINIC | Age: 27
End: 2022-06-20

## 2022-06-20 ENCOUNTER — LABORATORY RESULT (OUTPATIENT)
Age: 27
End: 2022-06-20

## 2022-06-20 VITALS
SYSTOLIC BLOOD PRESSURE: 99 MMHG | BODY MASS INDEX: 16.23 KG/M2 | HEIGHT: 66 IN | RESPIRATION RATE: 15 BRPM | HEART RATE: 94 BPM | OXYGEN SATURATION: 98 % | DIASTOLIC BLOOD PRESSURE: 62 MMHG | TEMPERATURE: 98.3 F | WEIGHT: 101 LBS

## 2022-06-20 DIAGNOSIS — Z00.00 ENCOUNTER FOR GENERAL ADULT MEDICAL EXAMINATION W/OUT ABNORMAL FINDINGS: ICD-10-CM

## 2022-06-20 PROCEDURE — 36415 COLL VENOUS BLD VENIPUNCTURE: CPT

## 2022-06-20 PROCEDURE — 99214 OFFICE O/P EST MOD 30 MIN: CPT | Mod: 25

## 2022-06-21 ENCOUNTER — NON-APPOINTMENT (OUTPATIENT)
Age: 27
End: 2022-06-21

## 2022-06-21 LAB
ALBUMIN SERPL ELPH-MCNC: 4.9 G/DL
ALP BLD-CCNC: 49 U/L
ALT SERPL-CCNC: 21 U/L
ANION GAP SERPL CALC-SCNC: 13 MMOL/L
APPEARANCE: CLEAR
AST SERPL-CCNC: 14 U/L
BASOPHILS # BLD AUTO: 0.04 K/UL
BASOPHILS NFR BLD AUTO: 0.5 %
BILIRUB SERPL-MCNC: 0.7 MG/DL
BILIRUBIN URINE: NEGATIVE
BLOOD URINE: NEGATIVE
BUN SERPL-MCNC: 13 MG/DL
CALCIUM SERPL-MCNC: 10.1 MG/DL
CHLORIDE SERPL-SCNC: 99 MMOL/L
CO2 SERPL-SCNC: 29 MMOL/L
COLOR: NORMAL
CREAT SERPL-MCNC: 0.69 MG/DL
CRP SERPL-MCNC: <3 MG/L
EGFR: 122 ML/MIN/1.73M2
EOSINOPHIL # BLD AUTO: 0 K/UL
EOSINOPHIL NFR BLD AUTO: 0 %
GLUCOSE QUALITATIVE U: NEGATIVE
GLUCOSE SERPL-MCNC: 115 MG/DL
HCT VFR BLD CALC: 43.9 %
HGB BLD-MCNC: 14.5 G/DL
IMM GRANULOCYTES NFR BLD AUTO: 0.4 %
KETONES URINE: NEGATIVE
KETONES URINE: NEGATIVE
LEUKOCYTE ESTERASE URINE: NEGATIVE
LYMPHOCYTES # BLD AUTO: 1.08 K/UL
LYMPHOCYTES NFR BLD AUTO: 13.8 %
MAN DIFF?: NORMAL
MCHC RBC-ENTMCNC: 33 GM/DL
MCHC RBC-ENTMCNC: 33 PG
MCV RBC AUTO: 99.8 FL
MONOCYTES # BLD AUTO: 0.06 K/UL
MONOCYTES NFR BLD AUTO: 0.8 %
NEUTROPHILS # BLD AUTO: 6.64 K/UL
NEUTROPHILS NFR BLD AUTO: 84.5 %
NITRITE URINE: NEGATIVE
PH URINE: 7.5
PLATELET # BLD AUTO: 341 K/UL
POTASSIUM SERPL-SCNC: 4.1 MMOL/L
PROT SERPL-MCNC: 6.9 G/DL
PROTEIN URINE: NEGATIVE
RBC # BLD: 4.4 M/UL
RBC # FLD: 13.2 %
SODIUM SERPL-SCNC: 141 MMOL/L
SPECIFIC GRAVITY URINE: 1.01
TSH SERPL-ACNC: 0.57 UIU/ML
UROBILINOGEN URINE: NORMAL
WBC # FLD AUTO: 7.85 K/UL

## 2022-06-23 ENCOUNTER — NON-APPOINTMENT (OUTPATIENT)
Age: 27
End: 2022-06-23

## 2022-06-23 ENCOUNTER — APPOINTMENT (OUTPATIENT)
Dept: INTERNAL MEDICINE | Facility: CLINIC | Age: 27
End: 2022-06-23
Payer: MEDICAID

## 2022-06-23 VITALS
DIASTOLIC BLOOD PRESSURE: 67 MMHG | HEART RATE: 94 BPM | SYSTOLIC BLOOD PRESSURE: 105 MMHG | OXYGEN SATURATION: 99 % | WEIGHT: 101 LBS | TEMPERATURE: 98.3 F | BODY MASS INDEX: 16.3 KG/M2

## 2022-06-23 DIAGNOSIS — Z09 ENCOUNTER FOR FOLLOW-UP EXAMINATION AFTER COMPLETED TREATMENT FOR CONDITIONS OTHER THAN MALIGNANT NEOPLASM: ICD-10-CM

## 2022-06-23 DIAGNOSIS — R63.4 ABNORMAL WEIGHT LOSS: ICD-10-CM

## 2022-06-23 DIAGNOSIS — Z80.0 FAMILY HISTORY OF MALIGNANT NEOPLASM OF DIGESTIVE ORGANS: ICD-10-CM

## 2022-06-23 DIAGNOSIS — Z11.51 ENCOUNTER FOR SCREENING FOR HUMAN PAPILLOMAVIRUS (HPV): ICD-10-CM

## 2022-06-23 DIAGNOSIS — Z83.79 FAMILY HISTORY OF OTHER DISEASES OF THE DIGESTIVE SYSTEM: ICD-10-CM

## 2022-06-23 PROCEDURE — 99495 TRANSJ CARE MGMT MOD F2F 14D: CPT | Mod: GC

## 2022-06-24 ENCOUNTER — APPOINTMENT (OUTPATIENT)
Dept: INTERNAL MEDICINE | Facility: CLINIC | Age: 27
End: 2022-06-24

## 2022-07-11 ENCOUNTER — NON-APPOINTMENT (OUTPATIENT)
Age: 27
End: 2022-07-11

## 2022-08-24 ENCOUNTER — APPOINTMENT (OUTPATIENT)
Dept: GASTROENTEROLOGY | Facility: CLINIC | Age: 27
End: 2022-08-24

## 2022-09-26 ENCOUNTER — NON-APPOINTMENT (OUTPATIENT)
Age: 27
End: 2022-09-26

## 2022-10-05 ENCOUNTER — APPOINTMENT (OUTPATIENT)
Dept: INTERNAL MEDICINE | Facility: CLINIC | Age: 27
End: 2022-10-05

## 2022-10-05 VITALS
DIASTOLIC BLOOD PRESSURE: 61 MMHG | HEART RATE: 89 BPM | BODY MASS INDEX: 19.77 KG/M2 | WEIGHT: 123 LBS | SYSTOLIC BLOOD PRESSURE: 95 MMHG | OXYGEN SATURATION: 97 % | TEMPERATURE: 97.2 F | HEIGHT: 66 IN

## 2022-10-05 DIAGNOSIS — L65.9 NONSCARRING HAIR LOSS, UNSPECIFIED: ICD-10-CM

## 2022-10-05 PROCEDURE — 99213 OFFICE O/P EST LOW 20 MIN: CPT | Mod: 25,GC

## 2022-10-05 PROCEDURE — 36415 COLL VENOUS BLD VENIPUNCTURE: CPT

## 2022-10-05 RX ORDER — PREDNISONE 5 MG/1
5 TABLET ORAL
Qty: 100 | Refills: 1 | Status: DISCONTINUED | COMMUNITY
Start: 2022-06-07 | End: 2022-10-05

## 2022-10-05 RX ORDER — PREDNISONE 10 MG/1
10 TABLET ORAL
Qty: 100 | Refills: 0 | Status: DISCONTINUED | COMMUNITY
Start: 2022-06-07 | End: 2022-10-05

## 2022-10-06 LAB
25(OH)D3 SERPL-MCNC: 55.8 NG/ML
IRON SATN MFR SERPL: 59 %
IRON SERPL-MCNC: 160 UG/DL
TIBC SERPL-MCNC: 272 UG/DL
UIBC SERPL-MCNC: 112 UG/DL
VIT B12 SERPL-MCNC: 513 PG/ML

## 2022-11-02 ENCOUNTER — EMERGENCY (EMERGENCY)
Facility: HOSPITAL | Age: 27
LOS: 1 days | Discharge: HOME | End: 2022-11-02
Admitting: EMERGENCY MEDICINE
Payer: COMMERCIAL

## 2022-11-02 VITALS
DIASTOLIC BLOOD PRESSURE: 65 MMHG | TEMPERATURE: 98 F | SYSTOLIC BLOOD PRESSURE: 119 MMHG | HEART RATE: 97 BPM | RESPIRATION RATE: 16 BRPM | WEIGHT: 119.93 LBS | OXYGEN SATURATION: 98 % | HEIGHT: 66 IN

## 2022-11-02 DIAGNOSIS — K51.90 ULCERATIVE COLITIS, UNSPECIFIED, WITHOUT COMPLICATIONS: ICD-10-CM

## 2022-11-02 DIAGNOSIS — S00.451A SUPERFICIAL FOREIGN BODY OF RIGHT EAR, INITIAL ENCOUNTER: ICD-10-CM
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
## 2022-11-02 DIAGNOSIS — Z88.1 ALLERGY STATUS TO OTHER ANTIBIOTIC AGENTS STATUS: ICD-10-CM
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
## 2022-11-02 DIAGNOSIS — Z88.0 ALLERGY STATUS TO PENICILLIN: ICD-10-CM

## 2022-11-02 PROCEDURE — 10120 INC&RMVL FB SUBQ TISS SMPL: CPT

## 2022-11-02 PROCEDURE — 99283 EMERGENCY DEPT VISIT LOW MDM: CPT | Mod: 25

## 2022-11-02 PROCEDURE — 99283 EMERGENCY DEPT VISIT LOW MDM: CPT

## 2022-11-02 RX ORDER — LIDOCAINE HCL 20 MG/ML
10 VIAL (ML) INJECTION ONCE
Refills: 0 | Status: COMPLETED | OUTPATIENT
Start: 2022-11-02 | End: 2022-11-02

## 2022-11-02 RX ADMIN — Medication 10 MILLILITER(S): at 19:13

## 2022-11-02 RX ADMIN — Medication 1 TABLET(S): at 20:02

## 2022-11-02 NOTE — ED PROVIDER NOTE - PHYSICAL EXAMINATION
VITAL SIGNS: I have reviewed nursing notes and confirm.  CONSTITUTIONAL: Well-developed; well-nourished; in no acute distress.   SKIN:  warm and dry, no acute rash.   HEAD:  normocephalic, atraumatic.  EYES: PERRL, EOM intact; conjunctiva and sclera clear.  ENT: No nasal discharge; airway clear. mild swelling of the right tragus with embedded earring, ttp, small amount of crusted drainage. no erythema or swelling of the auricle or pinna.  NECK: Supple; non tender.  EXT: Normal ROM. No clubbing, cyanosis or edema. 2+ pulses to b/l ue/le.  NEURO: Alert, oriented, grossly unremarkable  PSYCH: Cooperative, mood and affect appropriate.

## 2022-11-02 NOTE — ED PROVIDER NOTE - CLINICAL SUMMARY MEDICAL DECISION MAKING FREE TEXT BOX
Afebrile and hemodynamically stable. She has earring embedded with the tragus. Area anesthetized with lidocaine and small incision made over the back of the tragus to visualize the earring back. Able to remove earring without complication. Given bactrim ds and mupirocin ointment. Return precautions given.

## 2022-11-02 NOTE — ED PROVIDER NOTE - OBJECTIVE STATEMENT
26yo female w/ pmh of  p/w right tragus infx. Pt got R tragus pierced 7/2022 & has not had any complications. 1 wk ago pt noticed tragus became swollen and red. Pt has been cleaning area w/ salt water spray & mupirocin ointment that she already had. Pt noticed 3 days ago skin had formed over earing backing  and she could not remove piercing. Pt seen by derm today who said she needed to f/u w/ ENT and get a CT scan before surgical removal. Pt has associated right sided neck pain and purulent drainage from piercing. Pt denies fever/chills, HA, dizziness, change in hearing, drainage from ear canal, pain behind ear, dysphagia. 26yo female w/ pmh of  p/w right tragus infx. Pt got R tragus pierced 7/2022 & has not had any complications. 1 wk ago pt noticed tragus became swollen and red. Pt has been cleaning area w/ salt water spray & mupirocin ointment that she already had. Pt noticed 3 days ago skin had formed over earing backing  and she could not remove piercing. Pt seen by derm today who said she needed to go to ED. Pt reports small amount of purulent drainage from piercing. Pt denies fever/chills, HA, dizziness, change in hearing, drainage from ear canal, pain behind ear, dysphagia.

## 2022-11-02 NOTE — ED PROVIDER NOTE - NS ED ROS FT
Constitutional: No fever. No chills.  Eyes: No redness. No discharge. No vision change.   ENT: No sore throat. No ear pain.  Cardiovascular: No chest pain. No leg swelling.  Respiratory: No cough. No shortness of breath.  GI: No abdominal pain. No vomiting. No diarrhea.   MSK: No joint pain. No back pain.   Skin: No rash. No abrasions. +ear redness.  Neuro: No numbness. No weakness.   Psych: No known mental health issues.

## 2022-11-02 NOTE — ED PROVIDER NOTE - NSFOLLOWUPINSTRUCTIONS_ED_ALL_ED_FT
Clean area daily with saline spray.    Apply mupirocin ointment.    Take one tab of bactrim ds daily for 7 days.    Return to the Emergency Department if you develop fever>100.4F, worsening ear pain, worsening swelling or redness or any other concerns.

## 2022-11-02 NOTE — ED PROVIDER NOTE - PATIENT PORTAL LINK FT
You can access the FollowMyHealth Patient Portal offered by [facility] by registering at the following website: [email or web address]. By joining [employer]’s FollowMyHealth portal, you will also be able to view your health information using other applications (apps) compatible with our system.

## 2022-12-08 ENCOUNTER — APPOINTMENT (OUTPATIENT)
Dept: INTERNAL MEDICINE | Facility: CLINIC | Age: 27
End: 2022-12-08

## 2022-12-08 VITALS
WEIGHT: 125 LBS | DIASTOLIC BLOOD PRESSURE: 70 MMHG | TEMPERATURE: 98.5 F | HEIGHT: 66 IN | OXYGEN SATURATION: 96 % | HEART RATE: 90 BPM | SYSTOLIC BLOOD PRESSURE: 106 MMHG | BODY MASS INDEX: 20.09 KG/M2

## 2022-12-08 PROCEDURE — 99214 OFFICE O/P EST MOD 30 MIN: CPT | Mod: GC

## 2022-12-20 ENCOUNTER — APPOINTMENT (OUTPATIENT)
Dept: INTERNAL MEDICINE | Facility: CLINIC | Age: 27
End: 2022-12-20

## 2022-12-20 VITALS
WEIGHT: 125 LBS | HEART RATE: 85 BPM | OXYGEN SATURATION: 97 % | BODY MASS INDEX: 20.09 KG/M2 | DIASTOLIC BLOOD PRESSURE: 70 MMHG | SYSTOLIC BLOOD PRESSURE: 111 MMHG | HEIGHT: 66 IN | TEMPERATURE: 96.5 F | RESPIRATION RATE: 16 BRPM

## 2022-12-20 DIAGNOSIS — Z00.00 ENCOUNTER FOR GENERAL ADULT MEDICAL EXAMINATION W/OUT ABNORMAL FINDINGS: ICD-10-CM

## 2022-12-20 PROCEDURE — 99214 OFFICE O/P EST MOD 30 MIN: CPT | Mod: GC

## 2022-12-20 RX ORDER — MESALAMINE 800 MG/1
800 TABLET, DELAYED RELEASE ORAL 3 TIMES DAILY
Qty: 180 | Refills: 0 | Status: DISCONTINUED | COMMUNITY
Start: 2022-06-07 | End: 2022-12-20

## 2022-12-21 ENCOUNTER — TRANSCRIPTION ENCOUNTER (OUTPATIENT)
Age: 27
End: 2022-12-21

## 2022-12-22 ENCOUNTER — NON-APPOINTMENT (OUTPATIENT)
Age: 27
End: 2022-12-22

## 2023-01-04 ENCOUNTER — APPOINTMENT (OUTPATIENT)
Dept: INTERNAL MEDICINE | Facility: CLINIC | Age: 28
End: 2023-01-04
Payer: MEDICAID

## 2023-01-04 ENCOUNTER — APPOINTMENT (OUTPATIENT)
Dept: INTERNAL MEDICINE | Facility: CLINIC | Age: 28
End: 2023-01-04

## 2023-01-04 ENCOUNTER — TRANSCRIPTION ENCOUNTER (OUTPATIENT)
Age: 28
End: 2023-01-04

## 2023-01-04 VITALS
RESPIRATION RATE: 14 BRPM | HEART RATE: 85 BPM | WEIGHT: 125 LBS | BODY MASS INDEX: 20.09 KG/M2 | OXYGEN SATURATION: 98 % | HEIGHT: 66 IN | TEMPERATURE: 98 F | DIASTOLIC BLOOD PRESSURE: 67 MMHG | SYSTOLIC BLOOD PRESSURE: 111 MMHG

## 2023-01-04 PROCEDURE — 99213 OFFICE O/P EST LOW 20 MIN: CPT | Mod: GC

## 2023-01-13 ENCOUNTER — TRANSCRIPTION ENCOUNTER (OUTPATIENT)
Age: 28
End: 2023-01-13

## 2023-01-13 ENCOUNTER — APPOINTMENT (OUTPATIENT)
Dept: INTERNAL MEDICINE | Facility: CLINIC | Age: 28
End: 2023-01-13

## 2023-01-20 ENCOUNTER — APPOINTMENT (OUTPATIENT)
Dept: INTERNAL MEDICINE | Facility: CLINIC | Age: 28
End: 2023-01-20

## 2023-01-20 ENCOUNTER — TRANSCRIPTION ENCOUNTER (OUTPATIENT)
Age: 28
End: 2023-01-20

## 2023-01-27 ENCOUNTER — APPOINTMENT (OUTPATIENT)
Dept: INTERNAL MEDICINE | Facility: CLINIC | Age: 28
End: 2023-01-27

## 2023-01-27 ENCOUNTER — TRANSCRIPTION ENCOUNTER (OUTPATIENT)
Age: 28
End: 2023-01-27

## 2023-02-03 ENCOUNTER — APPOINTMENT (OUTPATIENT)
Dept: INTERNAL MEDICINE | Facility: CLINIC | Age: 28
End: 2023-02-03

## 2023-02-03 ENCOUNTER — TRANSCRIPTION ENCOUNTER (OUTPATIENT)
Age: 28
End: 2023-02-03

## 2023-02-08 DIAGNOSIS — F32.9 MAJOR DEPRESSIVE DISORDER, SINGLE EPISODE, UNSPECIFIED: ICD-10-CM

## 2023-02-16 ENCOUNTER — APPOINTMENT (OUTPATIENT)
Dept: INTERNAL MEDICINE | Facility: CLINIC | Age: 28
End: 2023-02-16
Payer: MEDICAID

## 2023-02-16 VITALS
OXYGEN SATURATION: 97 % | TEMPERATURE: 97.6 F | SYSTOLIC BLOOD PRESSURE: 117 MMHG | HEIGHT: 66 IN | BODY MASS INDEX: 20.57 KG/M2 | WEIGHT: 128 LBS | HEART RATE: 86 BPM | DIASTOLIC BLOOD PRESSURE: 72 MMHG

## 2023-02-16 DIAGNOSIS — K51.911 ULCERATIVE COLITIS, UNSPECIFIED WITH RECTAL BLEEDING: ICD-10-CM

## 2023-02-16 DIAGNOSIS — R45.89 OTHER SYMPTOMS AND SIGNS INVOLVING EMOTIONAL STATE: ICD-10-CM

## 2023-02-16 PROCEDURE — 99214 OFFICE O/P EST MOD 30 MIN: CPT | Mod: GC

## 2023-02-16 RX ORDER — ESCITALOPRAM OXALATE 10 MG/1
10 TABLET ORAL DAILY
Qty: 90 | Refills: 3 | Status: COMPLETED | COMMUNITY
Start: 2022-12-20 | End: 2023-02-16

## 2023-02-17 ENCOUNTER — TRANSCRIPTION ENCOUNTER (OUTPATIENT)
Age: 28
End: 2023-02-17

## 2023-02-17 ENCOUNTER — APPOINTMENT (OUTPATIENT)
Dept: INTERNAL MEDICINE | Facility: CLINIC | Age: 28
End: 2023-02-17

## 2023-02-17 PROBLEM — K51.911 ULCERATIVE COLITIS WITH RECTAL BLEEDING, UNSPECIFIED LOCATION: Noted: 2022-05-27

## 2023-02-17 PROBLEM — R45.89 DEPRESSED MOOD: Status: ACTIVE | Noted: 2022-12-08

## 2023-03-03 ENCOUNTER — TRANSCRIPTION ENCOUNTER (OUTPATIENT)
Age: 28
End: 2023-03-03

## 2023-03-03 ENCOUNTER — APPOINTMENT (OUTPATIENT)
Dept: INTERNAL MEDICINE | Facility: CLINIC | Age: 28
End: 2023-03-03

## 2023-03-17 ENCOUNTER — TRANSCRIPTION ENCOUNTER (OUTPATIENT)
Age: 28
End: 2023-03-17

## 2023-03-17 ENCOUNTER — APPOINTMENT (OUTPATIENT)
Dept: INTERNAL MEDICINE | Facility: CLINIC | Age: 28
End: 2023-03-17

## 2023-03-24 ENCOUNTER — TRANSCRIPTION ENCOUNTER (OUTPATIENT)
Age: 28
End: 2023-03-24

## 2023-03-24 ENCOUNTER — APPOINTMENT (OUTPATIENT)
Dept: INTERNAL MEDICINE | Facility: CLINIC | Age: 28
End: 2023-03-24

## 2023-04-07 ENCOUNTER — TRANSCRIPTION ENCOUNTER (OUTPATIENT)
Age: 28
End: 2023-04-07

## 2023-04-07 ENCOUNTER — APPOINTMENT (OUTPATIENT)
Dept: INTERNAL MEDICINE | Facility: CLINIC | Age: 28
End: 2023-04-07

## 2023-06-01 ENCOUNTER — APPOINTMENT (OUTPATIENT)
Dept: INTERNAL MEDICINE | Facility: CLINIC | Age: 28
End: 2023-06-01
Payer: MEDICAID

## 2023-06-01 VITALS
OXYGEN SATURATION: 98 % | BODY MASS INDEX: 20.41 KG/M2 | SYSTOLIC BLOOD PRESSURE: 101 MMHG | TEMPERATURE: 97.1 F | DIASTOLIC BLOOD PRESSURE: 70 MMHG | HEIGHT: 66 IN | HEART RATE: 103 BPM | WEIGHT: 127 LBS

## 2023-06-01 VITALS — HEART RATE: 96 BPM

## 2023-06-01 DIAGNOSIS — R73.9 HYPERGLYCEMIA, UNSPECIFIED: ICD-10-CM

## 2023-06-01 PROCEDURE — 99214 OFFICE O/P EST MOD 30 MIN: CPT | Mod: GC,25

## 2023-06-01 RX ORDER — FLUTICASONE PROPIONATE 50 UG/1
50 SPRAY, METERED NASAL DAILY
Qty: 1 | Refills: 0 | Status: DISCONTINUED | COMMUNITY
Start: 2022-12-08 | End: 2023-06-01

## 2023-06-01 RX ORDER — ALPRAZOLAM 0.25 MG/1
0.25 TABLET ORAL 3 TIMES DAILY
Qty: 42 | Refills: 0 | Status: DISCONTINUED | COMMUNITY
Start: 2022-12-20 | End: 2023-06-01

## 2023-06-01 RX ORDER — MESALAMINE 1.2 G/1
1.2 TABLET, DELAYED RELEASE ORAL DAILY
Qty: 60 | Refills: 0 | Status: DISCONTINUED | COMMUNITY
Start: 2022-05-27 | End: 2023-06-01

## 2023-06-02 LAB
ANION GAP SERPL CALC-SCNC: 12 MMOL/L
BUN SERPL-MCNC: 12 MG/DL
CALCIUM SERPL-MCNC: 9.5 MG/DL
CHLORIDE SERPL-SCNC: 100 MMOL/L
CO2 SERPL-SCNC: 26 MMOL/L
CREAT SERPL-MCNC: 0.73 MG/DL
EGFR: 115 ML/MIN/1.73M2
ESTIMATED AVERAGE GLUCOSE: 82 MG/DL
GLUCOSE SERPL-MCNC: 65 MG/DL
HBA1C MFR BLD HPLC: 4.5 %
POTASSIUM SERPL-SCNC: 3.7 MMOL/L
SODIUM SERPL-SCNC: 139 MMOL/L

## 2023-08-29 PROBLEM — K51.90 ULCERATIVE COLITIS, UNSPECIFIED, WITHOUT COMPLICATIONS: Chronic | Status: ACTIVE | Noted: 2022-05-16

## 2023-09-01 ENCOUNTER — APPOINTMENT (OUTPATIENT)
Dept: OTOLARYNGOLOGY | Facility: CLINIC | Age: 28
End: 2023-09-01
Payer: MEDICAID

## 2023-09-01 VITALS
TEMPERATURE: 98.2 F | WEIGHT: 127 LBS | BODY MASS INDEX: 20.41 KG/M2 | OXYGEN SATURATION: 98 % | SYSTOLIC BLOOD PRESSURE: 101 MMHG | HEART RATE: 81 BPM | HEIGHT: 66 IN | DIASTOLIC BLOOD PRESSURE: 65 MMHG

## 2023-09-01 DIAGNOSIS — H61.23 IMPACTED CERUMEN, BILATERAL: ICD-10-CM

## 2023-09-01 DIAGNOSIS — H93.8X2 OTHER SPECIFIED DISORDERS OF LEFT EAR: ICD-10-CM

## 2023-09-01 PROCEDURE — 99204 OFFICE O/P NEW MOD 45 MIN: CPT | Mod: 25

## 2023-09-01 PROCEDURE — 69210 REMOVE IMPACTED EAR WAX UNI: CPT

## 2023-09-01 NOTE — PHYSICAL EXAM
[Normal] : mucosa is normal [Midline] : trachea located in midline position [de-identified] : bilateral cerumen impaction- cleaned away with suction/curette with no issues.

## 2023-09-01 NOTE — PROCEDURE
[FreeTextEntry1] : Cerumen Removal [FreeTextEntry2] : Cerumen Impaction [FreeTextEntry3] :  Ear cleaning:  Cerumen Removal/Ear Cleaning for Otitis Externa Pre-operative Diagnosis: Bilateral Cerumen Impaction Post-operative Diagnosis: Same Procedure: Binocular microscopy with cerumen removal- [id number] Procedure Details: The patient was placed in the supine position. The operating microscope was positioned. I then placed the ear speculum in the EAC. Cerumen was then removed using a mixture of otologic curettes, and suction. The TM was noted to be intact. I then performed the procedure of the opposite ear in similar fashion. The patient tolerated procedure well. Findings: Bilateral Ear Canal - normal Bilateral Tympanic Membrane - normal Recommendations: Debrox Complications: None

## 2023-09-01 NOTE — HISTORY OF PRESENT ILLNESS
[de-identified] : [birth year] 27 y/o F is presenting with intermittent left aural fullness for the past week. She feels like her ear is plugged with water. She had this in the past, approximately 1 year ago and it lasted for three days and then resolved. She denies otalgia or otorrhea. She denies changes in hearing, tinnitus, or vertiginous symptoms. +qtips. +bruxism.   She is concerned it is related to her sinuses. She denies recent dental work or nasal congestion. She denies change in smell/ taste. She denies facial pain. No other ear, nose, or throat issues otherwise.

## 2023-09-05 ENCOUNTER — RX RENEWAL (OUTPATIENT)
Age: 28
End: 2023-09-05

## 2023-12-08 ENCOUNTER — APPOINTMENT (OUTPATIENT)
Dept: INTERNAL MEDICINE | Facility: CLINIC | Age: 28
End: 2023-12-08
Payer: MEDICAID

## 2023-12-08 ENCOUNTER — LABORATORY RESULT (OUTPATIENT)
Age: 28
End: 2023-12-08

## 2023-12-08 VITALS
SYSTOLIC BLOOD PRESSURE: 106 MMHG | OXYGEN SATURATION: 97 % | HEART RATE: 71 BPM | TEMPERATURE: 98 F | DIASTOLIC BLOOD PRESSURE: 69 MMHG

## 2023-12-08 DIAGNOSIS — Z12.4 ENCOUNTER FOR SCREENING FOR MALIGNANT NEOPLASM OF CERVIX: ICD-10-CM

## 2023-12-08 DIAGNOSIS — F32.A ANXIETY DISORDER, UNSPECIFIED: ICD-10-CM

## 2023-12-08 DIAGNOSIS — Z11.3 ENCOUNTER FOR SCREENING FOR INFECTIONS WITH A PREDOMINANTLY SEXUAL MODE OF TRANSMISSION: ICD-10-CM

## 2023-12-08 DIAGNOSIS — F41.9 ANXIETY DISORDER, UNSPECIFIED: ICD-10-CM

## 2023-12-08 PROCEDURE — 99213 OFFICE O/P EST LOW 20 MIN: CPT | Mod: GC,25

## 2023-12-08 RX ORDER — FLUTICASONE PROPIONATE 50 UG/1
50 SPRAY, METERED NASAL DAILY
Qty: 3 | Refills: 1 | Status: DISCONTINUED | COMMUNITY
Start: 2023-09-01 | End: 2023-12-08

## 2023-12-08 RX ORDER — MESALAMINE 1.2 G/1
1.2 TABLET, DELAYED RELEASE ORAL DAILY
Qty: 90 | Refills: 1 | Status: DISCONTINUED | COMMUNITY
Start: 2023-06-01 | End: 2023-12-08

## 2023-12-09 PROBLEM — F41.9 ANXIETY AND DEPRESSION: Status: ACTIVE | Noted: 2022-12-20

## 2023-12-11 ENCOUNTER — TRANSCRIPTION ENCOUNTER (OUTPATIENT)
Age: 28
End: 2023-12-11

## 2023-12-11 LAB
C TRACH RRNA SPEC QL NAA+PROBE: NOT DETECTED
HCV AB SER QL: NONREACTIVE
HCV S/CO RATIO: 0.1 S/CO
HIV1+2 AB SPEC QL IA.RAPID: NONREACTIVE
N GONORRHOEA RRNA SPEC QL NAA+PROBE: NOT DETECTED
SOURCE AMPLIFICATION: NORMAL
T PALLIDUM AB SER QL IA: NEGATIVE

## 2023-12-15 LAB
AMPHET UR-MCNC: NEGATIVE NG/ML
BARBITURATES UR-MCNC: NEGATIVE NG/ML
BENZODIAZ UR-MCNC: NEGATIVE NG/ML
COCAINE METAB.OTHER UR-MCNC: NEGATIVE NG/ML
CREATININE, URINE: 44.8 MG/DL
FENTANYL, URINE: NEGATIVE NG/ML
METHADONE UR-MCNC: NEGATIVE NG/ML
OPIATES UR-MCNC: NEGATIVE NG/ML
OXYCODONE/OXYMORPHONE, URINE: NEGATIVE NG/ML
PCP UR-MCNC: NEGATIVE NG/ML
PH, URINE: 5.8
PLEASE NOTE: DRUGSCRUR: NORMAL
THC UR QL: NORMAL NG/ML

## 2024-01-16 ENCOUNTER — RX RENEWAL (OUTPATIENT)
Age: 29
End: 2024-01-16

## 2024-01-31 ENCOUNTER — EMERGENCY (EMERGENCY)
Facility: HOSPITAL | Age: 29
LOS: 1 days | Discharge: HOME | End: 2024-01-31
Attending: STUDENT IN AN ORGANIZED HEALTH CARE EDUCATION/TRAINING PROGRAM | Admitting: STUDENT IN AN ORGANIZED HEALTH CARE EDUCATION/TRAINING PROGRAM
Payer: COMMERCIAL

## 2024-01-31 VITALS
SYSTOLIC BLOOD PRESSURE: 121 MMHG | WEIGHT: 136.91 LBS | HEART RATE: 100 BPM | TEMPERATURE: 98 F | RESPIRATION RATE: 18 BRPM | DIASTOLIC BLOOD PRESSURE: 62 MMHG | HEIGHT: 66 IN | OXYGEN SATURATION: 97 %

## 2024-01-31 DIAGNOSIS — Z88.8 ALLERGY STATUS TO OTHER DRUGS, MEDICAMENTS AND BIOLOGICAL SUBSTANCES STATUS: ICD-10-CM

## 2024-01-31 DIAGNOSIS — K08.89 OTHER SPECIFIED DISORDERS OF TEETH AND SUPPORTING STRUCTURES: ICD-10-CM

## 2024-01-31 DIAGNOSIS — Z88.0 ALLERGY STATUS TO PENICILLIN: ICD-10-CM

## 2024-01-31 DIAGNOSIS — F32.A DEPRESSION, UNSPECIFIED: ICD-10-CM

## 2024-01-31 DIAGNOSIS — Z88.1 ALLERGY STATUS TO OTHER ANTIBIOTIC AGENTS STATUS: ICD-10-CM

## 2024-01-31 DIAGNOSIS — K51.90 ULCERATIVE COLITIS, UNSPECIFIED, WITHOUT COMPLICATIONS: ICD-10-CM

## 2024-01-31 LAB
ANION GAP SERPL CALC-SCNC: 9 MMOL/L — SIGNIFICANT CHANGE UP (ref 5–17)
BASOPHILS # BLD AUTO: 0.02 K/UL — SIGNIFICANT CHANGE UP (ref 0–0.2)
BASOPHILS NFR BLD AUTO: 0.2 % — SIGNIFICANT CHANGE UP (ref 0–2)
BUN SERPL-MCNC: 8 MG/DL — SIGNIFICANT CHANGE UP (ref 7–23)
CALCIUM SERPL-MCNC: 9.9 MG/DL — SIGNIFICANT CHANGE UP (ref 8.4–10.5)
CHLORIDE SERPL-SCNC: 98 MMOL/L — SIGNIFICANT CHANGE UP (ref 96–108)
CO2 SERPL-SCNC: 28 MMOL/L — SIGNIFICANT CHANGE UP (ref 22–31)
CREAT SERPL-MCNC: 0.71 MG/DL — SIGNIFICANT CHANGE UP (ref 0.5–1.3)
EGFR: 118 ML/MIN/1.73M2 — SIGNIFICANT CHANGE UP
EOSINOPHIL # BLD AUTO: 0.04 K/UL — SIGNIFICANT CHANGE UP (ref 0–0.5)
EOSINOPHIL NFR BLD AUTO: 0.4 % — SIGNIFICANT CHANGE UP (ref 0–6)
GLUCOSE SERPL-MCNC: 90 MG/DL — SIGNIFICANT CHANGE UP (ref 70–99)
HCT VFR BLD CALC: 39.1 % — SIGNIFICANT CHANGE UP (ref 34.5–45)
HGB BLD-MCNC: 14.2 G/DL — SIGNIFICANT CHANGE UP (ref 11.5–15.5)
IMM GRANULOCYTES NFR BLD AUTO: 0.3 % — SIGNIFICANT CHANGE UP (ref 0–0.9)
LYMPHOCYTES # BLD AUTO: 1.76 K/UL — SIGNIFICANT CHANGE UP (ref 1–3.3)
LYMPHOCYTES # BLD AUTO: 19.1 % — SIGNIFICANT CHANGE UP (ref 13–44)
MCHC RBC-ENTMCNC: 33.3 PG — SIGNIFICANT CHANGE UP (ref 27–34)
MCHC RBC-ENTMCNC: 36.3 GM/DL — HIGH (ref 32–36)
MCV RBC AUTO: 91.8 FL — SIGNIFICANT CHANGE UP (ref 80–100)
MONOCYTES # BLD AUTO: 0.54 K/UL — SIGNIFICANT CHANGE UP (ref 0–0.9)
MONOCYTES NFR BLD AUTO: 5.9 % — SIGNIFICANT CHANGE UP (ref 2–14)
NEUTROPHILS # BLD AUTO: 6.84 K/UL — SIGNIFICANT CHANGE UP (ref 1.8–7.4)
NEUTROPHILS NFR BLD AUTO: 74.1 % — SIGNIFICANT CHANGE UP (ref 43–77)
NRBC # BLD: 0 /100 WBCS — SIGNIFICANT CHANGE UP (ref 0–0)
PLATELET # BLD AUTO: 223 K/UL — SIGNIFICANT CHANGE UP (ref 150–400)
POTASSIUM SERPL-MCNC: 3.6 MMOL/L — SIGNIFICANT CHANGE UP (ref 3.5–5.3)
POTASSIUM SERPL-SCNC: 3.6 MMOL/L — SIGNIFICANT CHANGE UP (ref 3.5–5.3)
RBC # BLD: 4.26 M/UL — SIGNIFICANT CHANGE UP (ref 3.8–5.2)
RBC # FLD: 11.4 % — SIGNIFICANT CHANGE UP (ref 10.3–14.5)
SODIUM SERPL-SCNC: 135 MMOL/L — SIGNIFICANT CHANGE UP (ref 135–145)
WBC # BLD: 9.23 K/UL — SIGNIFICANT CHANGE UP (ref 3.8–10.5)
WBC # FLD AUTO: 9.23 K/UL — SIGNIFICANT CHANGE UP (ref 3.8–10.5)

## 2024-01-31 PROCEDURE — 96375 TX/PRO/DX INJ NEW DRUG ADDON: CPT

## 2024-01-31 PROCEDURE — 36415 COLL VENOUS BLD VENIPUNCTURE: CPT

## 2024-01-31 PROCEDURE — 99284 EMERGENCY DEPT VISIT MOD MDM: CPT | Mod: 25

## 2024-01-31 PROCEDURE — 96365 THER/PROPH/DIAG IV INF INIT: CPT

## 2024-01-31 PROCEDURE — 85025 COMPLETE CBC W/AUTO DIFF WBC: CPT

## 2024-01-31 PROCEDURE — 80048 BASIC METABOLIC PNL TOTAL CA: CPT

## 2024-01-31 PROCEDURE — 99284 EMERGENCY DEPT VISIT MOD MDM: CPT

## 2024-01-31 RX ORDER — OXYCODONE AND ACETAMINOPHEN 5; 325 MG/1; MG/1
1 TABLET ORAL
Qty: 8 | Refills: 0
Start: 2024-01-31 | End: 2024-02-01

## 2024-01-31 RX ORDER — ONDANSETRON 8 MG/1
1 TABLET, FILM COATED ORAL
Qty: 12 | Refills: 0
Start: 2024-01-31 | End: 2024-02-02

## 2024-01-31 RX ORDER — SODIUM CHLORIDE 9 MG/ML
1000 INJECTION INTRAMUSCULAR; INTRAVENOUS; SUBCUTANEOUS ONCE
Refills: 0 | Status: COMPLETED | OUTPATIENT
Start: 2024-01-31 | End: 2024-01-31

## 2024-01-31 RX ORDER — AMOXICILLIN 250 MG/5ML
1 SUSPENSION, RECONSTITUTED, ORAL (ML) ORAL
Qty: 21 | Refills: 0
Start: 2024-01-31 | End: 2024-02-06

## 2024-01-31 RX ORDER — PENICILLIN V POTASIUM 500 MG/1
1 TABLET OROPHARYNGEAL
Qty: 14 | Refills: 0
Start: 2024-01-31 | End: 2024-02-06

## 2024-01-31 RX ORDER — AMPICILLIN SODIUM AND SULBACTAM SODIUM 250; 125 MG/ML; MG/ML
3 INJECTION, POWDER, FOR SUSPENSION INTRAMUSCULAR; INTRAVENOUS ONCE
Refills: 0 | Status: COMPLETED | OUTPATIENT
Start: 2024-01-31 | End: 2024-01-31

## 2024-01-31 RX ORDER — IBUPROFEN 200 MG
1 TABLET ORAL
Qty: 20 | Refills: 0
Start: 2024-01-31 | End: 2024-02-04

## 2024-01-31 RX ORDER — KETOROLAC TROMETHAMINE 30 MG/ML
15 SYRINGE (ML) INJECTION ONCE
Refills: 0 | Status: DISCONTINUED | OUTPATIENT
Start: 2024-01-31 | End: 2024-01-31

## 2024-01-31 RX ORDER — ONDANSETRON 8 MG/1
4 TABLET, FILM COATED ORAL ONCE
Refills: 0 | Status: COMPLETED | OUTPATIENT
Start: 2024-01-31 | End: 2024-01-31

## 2024-01-31 RX ADMIN — ONDANSETRON 4 MILLIGRAM(S): 8 TABLET, FILM COATED ORAL at 19:32

## 2024-01-31 RX ADMIN — SODIUM CHLORIDE 1000 MILLILITER(S): 9 INJECTION INTRAMUSCULAR; INTRAVENOUS; SUBCUTANEOUS at 18:40

## 2024-01-31 RX ADMIN — Medication 15 MILLIGRAM(S): at 18:40

## 2024-01-31 RX ADMIN — Medication 15 MILLIGRAM(S): at 17:56

## 2024-01-31 RX ADMIN — AMPICILLIN SODIUM AND SULBACTAM SODIUM 200 GRAM(S): 250; 125 INJECTION, POWDER, FOR SUSPENSION INTRAMUSCULAR; INTRAVENOUS at 17:55

## 2024-01-31 RX ADMIN — AMPICILLIN SODIUM AND SULBACTAM SODIUM 3 GRAM(S): 250; 125 INJECTION, POWDER, FOR SUSPENSION INTRAMUSCULAR; INTRAVENOUS at 18:40

## 2024-01-31 NOTE — ED ADULT TRIAGE NOTE - CHIEF COMPLAINT QUOTE
Pt presents to ED sent from [facility] rec IV Fluids and antibiotics. Pt states, " I'm having severe L sided tooth pain from my wisdom tooth, the dentist told me it was too inflamed to operate and I would need to come to the ER and receive medication". Hx Colitis. Denies diarrhea, bloody stools. Reports decreased P.O due to pain.

## 2024-01-31 NOTE — ED PROVIDER NOTE - NSFOLLOWUPINSTRUCTIONS_ED_ALL_ED_FT
Take antibiotics as prescribed with a probiotic.  Follow up with your dentist       Toothache    WHAT YOU NEED TO KNOW:    A toothache is pain that is caused by irritation of the nerves in the center of your tooth. The irritation may be caused by several problems, such as a cavity, an infection, a cracked tooth, or gum disease. Tooth Anatomy         DISCHARGE INSTRUCTIONS:    Return to the emergency department if:     You have trouble breathing or swallowing.       You have swelling in your face or neck.     Contact your dentist if:     You have a fever and chills.       You have trouble opening or closing your mouth.       You have swelling around your tooth.       You have questions or concerns about your condition or care.    Medicines: You may need any of the following:     NSAIDs, such as ibuprofen, help decrease swelling, pain, and fever. This medicine is available with or without a doctor's order. NSAIDs can cause stomach bleeding or kidney problems in certain people. If you take blood thinner medicine, always ask if NSAIDs are safe for you. Always read the medicine label and follow directions. Do not give these medicines to children under 6 months of age without direction from your child's healthcare provider.      Acetaminophen decreases pain and fever. It is available without a doctor's order. Ask how much to take and how often to take it. Follow directions. Acetaminophen can cause liver damage if not taken correctly.      Prescription pain medicine may be given. Ask your healthcare provider how to take this medicine safely. Some prescription pain medicines contain acetaminophen. Do not take other medicines that contain acetaminophen without talking to your healthcare provider. Too much acetaminophen may cause liver damage. Prescription pain medicine may cause constipation. Ask your healthcare provider how to prevent or treat constipation.       Antibiotics help treat or prevent a bacterial infection.       Take your medicine as directed. Contact your healthcare provider if you think your medicine is not helping or if you have side effects. Tell him of her if you are allergic to any medicine. Keep a list of the medicines, vitamins, and herbs you take. Include the amounts, and when and why you take them. Bring the list or the pill bottles to follow-up visits. Carry your medicine list with you in case of an emergency.    Self-care:     Rinse your mouth with warm salt water 4 times a day or as directed.       Eat soft foods to help relieve pain caused by chewing.       Apply ice on your jaw or cheek for 15 to 20 minutes every hour or as directed. Use an ice pack, or put crushed ice in a plastic bag. Cover it with a towel before you apply it. Ice helps prevent tissue damage and decreases swelling and pain.    Help prevent a toothache:     Brush your teeth at least 2 times a day.      Use dental floss to clean between your teeth at least 1 time a day.      See your dentist regularly every 6 months for dental cleanings and oral exams.    Follow up with your dentist as directed: You may be referred to a dental surgeon. Write down your questions so you remember to ask them during your visits

## 2024-01-31 NOTE — ED PROVIDER NOTE - PHYSICAL EXAMINATION
CONSTITUTIONAL: Well-appearing;  in no apparent distress.   HEAD: Normocephalic; atraumatic.   EYES: PERRL; EOM intact; conjunctiva and sclera clear  ENT: +Tenderness to L lower 3rd molar with tenderness to gums, no fluctuance, no trismus   NECK: Supple; non-tender;   CARDIOVASCULAR: rrr,  RESPIRATORY: Breathing easily;   MSK: FROM at all extremities, normal tone   EXT: No cyanosis or edema; N/V intact  SKIN: Normal for age and race; warm; dry; good turgor; no apparent lesions or rash.

## 2024-01-31 NOTE — ED ADULT NURSE NOTE - CHIEF COMPLAINT QUOTE
Pt presents to ED sent from [facility] rec IV Fluids and antibiotics. Pt states, " I'm having severe L sided tooth pain from my wisdom tooth, the dentist told me it was too inflamed to operate and I would need to come to the ER and receive medication". Hx Colitis. Denies diarrhea, bloody stools. Reports decreased P.O due to pain.

## 2024-01-31 NOTE — ED PROVIDER NOTE - CLINICAL SUMMARY MEDICAL DECISION MAKING FREE TEXT BOX
27 yo F with a pmh of UC, depression and anxiety c/o L lower wisdom x 3 days. Reports swelling. Went to [facility] dental today and was told that the tooth was growing into the gums and that it was too inflamed and swollen to take the tooth out and told to come to the ED For abx to reduce the inflammation. Denies fever, chills. Pt has a f/u appt next week. Afebrile. +Tenderness to L lower 3rd molar with tenderness to gums, no fluctuance, no trismus 27 yo F with a pmh of UC, depression and anxiety c/o L lower wisdom x 3 days. Reports swelling. Went to [facility] dental today and was told that the tooth was growing into the gums and that it was too inflamed and swollen to take the tooth out and told to come to the ED For abx to reduce the inflammation. Denies fever, chills. Pt has a f/u appt next week. Afebrile. +Tenderness to L lower 3rd molar with tenderness to gums, no fluctuance, no trismus. labs wnl. low suspicion for infection, will cover with abx and have pt take probiotic and f/u with dental

## 2024-01-31 NOTE — ED ADULT NURSE NOTE - OBJECTIVE STATEMENT
28yoF PMH UC and depression came to ED c/o L sided toothache x3 days. Pt states she went to [facility] dentistry today and they referred her here for IV fluids and antibiotics. Pt states that her L wisdom tooth is inflamed but she is unable to take oral antibiotics because they cause her UC to flare up. Pt denies fever, chest pain, SOB N/V/D.

## 2024-01-31 NOTE — ED PROVIDER NOTE - PATIENT PORTAL LINK FT
You can access the FollowMyHealth Patient Portal offered by [facility] by registering at the following website: [email or web address]. By joining [employer]’s FollowMyHealth portal, you will also be able to view your health information using other applications (apps) compatible with our system.

## 2024-01-31 NOTE — ED PROVIDER NOTE - OBJECTIVE STATEMENT
27 yo F with a pmh of UC, depression and anxiety c/o L lower wisdom x 3 days. Reports swelling. Went to [facility] dental today and was told that the tooth was growing into the gums and that it was too inflamed and swollen to take the tooth out and told to come to the ED For abx to reduce the inflammation. Denies fever, chills. Pt has a f/u appt next week.

## 2024-02-09 ENCOUNTER — APPOINTMENT (OUTPATIENT)
Dept: INTERNAL MEDICINE | Facility: CLINIC | Age: 29
End: 2024-02-09
Payer: MEDICAID

## 2024-02-09 VITALS
HEIGHT: 60 IN | SYSTOLIC BLOOD PRESSURE: 120 MMHG | TEMPERATURE: 98.4 F | HEART RATE: 86 BPM | OXYGEN SATURATION: 96 % | DIASTOLIC BLOOD PRESSURE: 77 MMHG | BODY MASS INDEX: 25.91 KG/M2 | WEIGHT: 132 LBS

## 2024-02-09 DIAGNOSIS — K52.1 TOXIC GASTROENTERITIS AND COLITIS: ICD-10-CM

## 2024-02-09 DIAGNOSIS — T36.95XA TOXIC GASTROENTERITIS AND COLITIS: ICD-10-CM

## 2024-02-09 PROCEDURE — G2211 COMPLEX E/M VISIT ADD ON: CPT | Mod: NC,1L

## 2024-02-09 PROCEDURE — 99214 OFFICE O/P EST MOD 30 MIN: CPT | Mod: 25

## 2024-02-09 NOTE — PHYSICAL EXAM
[No Acute Distress] : no acute distress [Normal Sclera/Conjunctiva] : normal sclera/conjunctiva [Normal Outer Ear/Nose] : the outer ears and nose were normal in appearance [No Lymphadenopathy] : no lymphadenopathy [Supple] : supple [No Respiratory Distress] : no respiratory distress  [No Accessory Muscle Use] : no accessory muscle use [Clear to Auscultation] : lungs were clear to auscultation bilaterally [Normal Rate] : normal rate  [Regular Rhythm] : with a regular rhythm [Normal S1, S2] : normal S1 and S2 [Pedal Pulses Present] : the pedal pulses are present [No Edema] : there was no peripheral edema [Soft] : abdomen soft [Non Tender] : non-tender [Non-distended] : non-distended [Normal Bowel Sounds] : normal bowel sounds [Alert and Oriented x3] : oriented to person, place, and time [de-identified] : No left molar erythema or discharge noted on examination.

## 2024-02-09 NOTE — HISTORY OF PRESENT ILLNESS
[Moderate] : moderate [___ Weeks ago] : [unfilled] weeks ago [Constant] : constant [Diarrhea] : diarrhea [Sore Throat] : sore throat [Abdominal Pain] : abdominal pain [OTC Remedies] : OTC remedies [Improving] : improving [FreeTextEntry8] : Ms. Aviles is a 28 year old female with a past medical history of ulcerative colitis controlled on Mesalamine, Anxiety/Depression who presents with concern for GERD symptoms and diarrhea over the past couple of weeks. Pt states that she is experiencing and acidic taste in throat, burning when defecating and stool is yellow in color for 2 weeks. Patient then developed a lot of jaw pain that was noted to be due to a wisdom tooth infection, which the patient has had removed. She was placed on antibiotics for the wisdom tooth removal, with antibiotic initiation she developed a significant amount of diarrhea. She has finished the antibiotics yesterday evening but has not had resolution of diarrhea symptoms.   At this time the patient is able to eat and drink though continues to have symptoms of diarrhea and throat pain.

## 2024-02-09 NOTE — ASSESSMENT
[FreeTextEntry1] : RTC in 6 months to one year as previously planned.   Case discussed with DR. MINOR.

## 2024-02-09 NOTE — REVIEW OF SYSTEMS
[Abdominal Pain] : abdominal pain [Diarrhea] : diarrhea [Heartburn] : heartburn [Fever] : no fever [Chills] : no chills [Recent Change In Weight] : ~T no recent weight change [Pain] : no pain [Itching] : no itching [Earache] : no earache [Hearing Loss] : no hearing loss [Sore Throat] : no sore throat [Chest Pain] : no chest pain [Palpitations] : no palpitations [Lower Ext Edema] : no lower extremity edema [Shortness Of Breath] : no shortness of breath [Wheezing] : no wheezing [Dyspnea on Exertion] : no dyspnea on exertion [Nausea] : no nausea [Constipation] : no constipation [Dysuria] : no dysuria [Incontinence] : no incontinence [Frequency] : no frequency [Joint Stiffness] : no joint stiffness [Back Pain] : no back pain

## 2024-02-15 ENCOUNTER — APPOINTMENT (OUTPATIENT)
Dept: INTERNAL MEDICINE | Facility: CLINIC | Age: 29
End: 2024-02-15

## 2024-03-18 ENCOUNTER — APPOINTMENT (OUTPATIENT)
Dept: INTERNAL MEDICINE | Facility: CLINIC | Age: 29
End: 2024-03-18
Payer: MEDICAID

## 2024-03-18 VITALS
OXYGEN SATURATION: 99 % | WEIGHT: 130 LBS | HEART RATE: 96 BPM | TEMPERATURE: 98.2 F | SYSTOLIC BLOOD PRESSURE: 109 MMHG | BODY MASS INDEX: 25.39 KG/M2 | DIASTOLIC BLOOD PRESSURE: 69 MMHG

## 2024-03-18 DIAGNOSIS — K21.9 GASTRO-ESOPHAGEAL REFLUX DISEASE W/OUT ESOPHAGITIS: ICD-10-CM

## 2024-03-18 DIAGNOSIS — G47.00 INSOMNIA, UNSPECIFIED: ICD-10-CM

## 2024-03-18 PROCEDURE — 99214 OFFICE O/P EST MOD 30 MIN: CPT | Mod: GC

## 2024-03-18 RX ORDER — ALPRAZOLAM 0.5 MG/1
0.5 TABLET ORAL
Qty: 14 | Refills: 0 | Status: ACTIVE | COMMUNITY
Start: 2024-03-18 | End: 1900-01-01

## 2024-03-18 RX ORDER — LOPERAMIDE HYDROCHLORIDE 2 MG/1
2 CAPSULE ORAL 4 TIMES DAILY
Qty: 30 | Refills: 0 | Status: DISCONTINUED | COMMUNITY
Start: 2024-02-09 | End: 2024-03-18

## 2024-03-18 RX ORDER — ALPRAZOLAM 0.25 MG/1
0.25 TABLET ORAL
Qty: 30 | Refills: 0 | Status: DISCONTINUED | COMMUNITY
Start: 2023-06-01 | End: 2024-03-18

## 2024-03-18 NOTE — HEALTH RISK ASSESSMENT
[0] : 2) Feeling down, depressed, or hopeless: Not at all (0) [PHQ-2 Negative - No further assessment needed] : PHQ-2 Negative - No further assessment needed [[id number]] : 0

## 2024-03-18 NOTE — HISTORY OF PRESENT ILLNESS
[FreeTextEntry1] : pt is here for meds renewal. [de-identified] : 28 year old female with a past medical history of ulcerative colitis on Mesalamine, Anxiety/Depression, and GERD who presents for refill of Alprazolam. She reports over the past few months having illnesses that have caused her insomnia to worsen. This includes in February having antibiotic-associated diarrhea after taking antibiotics for wisdom tooth infection. She also had severe GERD with burning sensation in her throat and a sore throat. She started omeprazole 20mg qd in the morning in February which she has increased to 2 tabs in the morning. She feels the omeprazole has helped her symptoms a lot, but she still wakes up with a sore throat and hoarseness. Her diarrhea has resolved. She has increased the amount of Alprazolam from 1 to 2 tabs at bedtime to help her sleep. She reports using it only as needed for sleep and not taking it every night. She recently stopped smoking cannabis to try to help her acid reflux. Denies fever, chills, sob, chest pain, and abd pain.

## 2024-03-18 NOTE — ASSESSMENT
[FreeTextEntry1] : 28 year old female with a past medical history of ulcerative colitis on Mesalamine, Anxiety/Depression, and GERD who presents for refill of Alprazolam.

## 2024-03-18 NOTE — END OF VISIT
[] : Resident [FreeTextEntry3] : Patient has anxiety, depression in addition to UC. She uses alprazolam 0.5 mg hs on occasion in addition to her regular dose of Lexapro. She states that without it, recently she lies awake all night. Because of uncontrolled insomnia, we will refill limited dose of alprazolam on the condition she sees a psychiatrist. She is agreeable. Will refer.

## 2024-03-18 NOTE — REVIEW OF SYSTEMS
[Sore Throat] : sore throat [Heartburn] : heartburn [Negative] : Heme/Lymph [de-identified] : +insomnia

## 2024-03-18 NOTE — PLAN
[FreeTextEntry1] :  #Ulcerative colitis Pt follows w/ GI-Dr. Coleman. Takes Mesalamine 1.2grams 3 tabs a day, which she has been tolerating well.   - c/w Mesalamine 1.2grams 3tabs a day - f/u with GI at apt in April  #GERD  #LPR Patient reports improvement of acid reflux symptoms since starting Omeprazole 20mg qd. She has increased the dose to 40mg qd. She reports continued symptoms of sore throat when waking up, hoarseness, and acidic taste in throat. Differential includes Laryngopharyngeal reflux vs GERD. - increase Pantoprazole to 40mg qd, advised patient to take medication in morning at least 30min-1 hour prior to breakfast - ENT referral for LPR evaluation  - GI referral, patient has apt in April for follow-up  #Insomnia Patient reports using alprazolam 0.5mg as needed to help her sleep when she has episodes of illness and/or anxiety.  - refill Alprazolam 0.5mg as needed for insomnia- will give total of 14 tablets - psychiatry referral   #Anxiety/Depression - c/w Lexapro 10mg qd - f/u psychiatry  #Antibiotic-Associated Diarrhea- Resolved Last visit patient w/ diarrhea from recent antibiotics and started on Loperamide prn for diarrhea. Patient no longer requiring loperamide and denies consistent diarrhea.  RTC as needed and for annual visit

## 2024-03-18 NOTE — PHYSICAL EXAM
[No Acute Distress] : no acute distress [Well-Appearing] : well-appearing [Normal Sclera/Conjunctiva] : normal sclera/conjunctiva [No Respiratory Distress] : no respiratory distress  [Clear to Auscultation] : lungs were clear to auscultation bilaterally [No Accessory Muscle Use] : no accessory muscle use [Normal Rate] : normal rate  [Regular Rhythm] : with a regular rhythm [Normal S1, S2] : normal S1 and S2 [No Edema] : there was no peripheral edema [Normal Affect] : the affect was normal [Alert and Oriented x3] : oriented to person, place, and time

## 2024-04-08 ENCOUNTER — APPOINTMENT (OUTPATIENT)
Dept: GASTROENTEROLOGY | Facility: CLINIC | Age: 29
End: 2024-04-08
Payer: MEDICAID

## 2024-04-08 VITALS
SYSTOLIC BLOOD PRESSURE: 110 MMHG | WEIGHT: 133 LBS | BODY MASS INDEX: 21.38 KG/M2 | TEMPERATURE: 96.9 F | RESPIRATION RATE: 16 BRPM | HEART RATE: 104 BPM | OXYGEN SATURATION: 98 % | DIASTOLIC BLOOD PRESSURE: 70 MMHG | HEIGHT: 66 IN

## 2024-04-08 DIAGNOSIS — K51.90 ULCERATIVE COLITIS, UNSPECIFIED, W/OUT COMPLICATIONS: ICD-10-CM

## 2024-04-08 PROCEDURE — 99215 OFFICE O/P EST HI 40 MIN: CPT

## 2024-04-08 PROCEDURE — G2211 COMPLEX E/M VISIT ADD ON: CPT | Mod: NC,1L

## 2024-04-08 RX ORDER — OMEPRAZOLE 40 MG/1
40 CAPSULE, DELAYED RELEASE ORAL AS DIRECTED
Qty: 90 | Refills: 2 | Status: DISCONTINUED | COMMUNITY
Start: 2024-02-09 | End: 2024-04-08

## 2024-04-14 NOTE — ASSESSMENT
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 will order pre-biologic labs today which she is agreeable to - Schedule patient for egd and colonoscopy at [facility] with Suprep. Discussed R/A/I/B with patient. Education provided on preparation instructions and the need for an escort. - if negative infectious workup, may consider steroids to assist patient with symptoms - advise patient to go to ER if symptoms worsen in severity, unable to tolerate PO intake, pt verbalized understanding  - continue Lialda 3 tabs/day

## 2024-04-14 NOTE — DISCUSSION/SUMMARY
[FreeTextEntry1] : Generally she is doing better than she was 1 year ago. She is not having any rectal bleeding. She has BMs 3-4x/day, generally in the morning. Sometimes a little gas and urgency but it is much better than prior issues.   She feels mesalamine has been helpful.  Currently takes 3 tabs/day.   Is still opposed to getting a colonoscopy.  Hesitant to do imaging.   Discussed again at length the risks of not getting a colonoscopy for surveillance (has not had a colonoscopy since 2012), including a risk of missing dysplasia or malignancy. Patient expresses understanding of these risks and DECLINES to have the colonoscopy. Still DOES NOT want to consider biologics and wants to stay on mesalamine. I discussed with her that without a colonoscopy or imaging study I cannot objectively assess for improvement in disease activity. Again patient expresses understanding and does not wish to have any testing done.   Plan to refill lialda at 3.6g dose, which is what she is agreeable to taking at this time.   Follow up in 6 months

## 2024-04-14 NOTE — END OF VISIT
[FreeTextEntry3] : Patient was seen and discussed with np gloria beckford Note was edited and amended as necessary Patient was physically seen at [address] [Time Spent: ___ minutes] : I have spent [unfilled] minutes of time on the encounter.

## 2024-04-14 NOTE — HISTORY OF PRESENT ILLNESS
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  In February, began to have a sore throat that she believes was caused by silent reflux. Symptoms got worse after course of antibiotics she received for a wisdom tooth infection, took antibiotics and then had tooth extraction. At which point she had difficulty eating solids and painful swallowing. PCP prescribed omeprazole which helped her throat but eventually caused increased abdominal pressure and cramping so she discontinued. Reports her throat has improved but still complains of mild acidic burning sensation in the morning, occasional heartburn, taking pepcid which helps. no nocturnal reflux.  For the past two weeks has seen intermittent blood in her stool. Having 4-5 BMs daily, soft to hard, + urgency, int abdominal pain. Denies fevers, chills. denies nocturnal BMs, nausea, vomiting, unintentional weight loss, dysphagia, joint pain, rashes  Currently takes Lialda 3 tabs/day.  Denies prior EGD  Flex Sig 5/2022: Abnormal vascularity, erythema, exudate and erosions in the rectum and sigmoid colon compatible with Perry 2 score for ulcerative colitis. (Biopsy). PATH: - Colonic mucosa with moderately active chronic colitis with cryptitis and crypt abscess - Negative for dysplasia  PRIOR: previously off all therapy for >10 years - biologics had been discussed (IFX) but her mother did not want her to start as she thought it was too experimental at the time. Has not undergone colonoscopy in 10 years due to "traumatic experience" during which she woke up during procedure. She has had several small episodic flares over the years which were steroid responsive, and also iterates history of C diff infection. Patient presented to [facility] on May 15 with multiple episodes of diarrhea and hematochezia associated with abdominal pain. Stool studies were unrevealing, CRP was >60. She was started on IV steroids with immediate clinical improvement. Flexible sigmoidoscopy revealed Perry II colitis from the rectum to the extent of the exam ~15cm from the anal verge, and biopsies revealed moderate active chronic colitis without dysplasia. Patient was discharged on May 18th with instructions for Prednisone taper. Patient presents today having tapered down to Prednisone 50mg QD in what appears to be steroid-induced clinical remission with improved abdominal pain (though admits some bloating that had been there prior to flare), no further bleeding, and now having formed stools. Does state she has some "burning" of the tongue. Endorses weight loss over the course of the past month on the order of ~10lbs. Endorses family history of IBD in sister who was diagnosed with Crohn's 4 years ago.

## 2024-05-20 ENCOUNTER — NON-APPOINTMENT (OUTPATIENT)
Age: 29
End: 2024-05-20

## 2024-05-20 RX ORDER — MESALAMINE 1.2 G/1
1.2 TABLET, DELAYED RELEASE ORAL
Qty: 90 | Refills: 0 | Status: ACTIVE | COMMUNITY
Start: 2023-10-12 | End: 1900-01-01

## 2024-06-17 ENCOUNTER — RX RENEWAL (OUTPATIENT)
Age: 29
End: 2024-06-17

## 2024-06-17 RX ORDER — ESCITALOPRAM OXALATE 10 MG/1
10 TABLET ORAL DAILY
Qty: 30 | Refills: 3 | Status: ACTIVE | COMMUNITY
Start: 2023-02-16 | End: 1900-01-01

## 2024-08-26 ENCOUNTER — APPOINTMENT (OUTPATIENT)
Dept: INTERNAL MEDICINE | Facility: CLINIC | Age: 29
End: 2024-08-26
Payer: MEDICAID

## 2024-08-26 DIAGNOSIS — F32.A ANXIETY DISORDER, UNSPECIFIED: ICD-10-CM

## 2024-08-26 DIAGNOSIS — F41.9 ANXIETY DISORDER, UNSPECIFIED: ICD-10-CM

## 2024-08-26 DIAGNOSIS — K51.90 ULCERATIVE COLITIS, UNSPECIFIED, W/OUT COMPLICATIONS: ICD-10-CM

## 2024-08-26 PROCEDURE — 99442: CPT | Mod: 93

## 2024-08-26 RX ORDER — ESCITALOPRAM OXALATE 20 MG/1
20 TABLET ORAL DAILY
Qty: 90 | Refills: 0 | Status: ACTIVE | COMMUNITY
Start: 2024-08-26 | End: 1900-01-01

## 2024-08-30 NOTE — END OF VISIT
[FreeTextEntry3] : I was present with the Resident during the key portions of this encounter and provided supervision via audio/visual technology.  I agree with the findings and plan as documented in the Resident's note, unless noted below.

## 2024-08-30 NOTE — HISTORY OF PRESENT ILLNESS
[Home] : at home, [unfilled] , at the time of the visit. [Medical Office: ([facility])___] : at the medical office located in  [Verbal consent obtained from patient] : the patient, [unfilled] [FreeTextEntry1] : Change in Lexapro Dosage [de-identified] : 30 y/o F PMHx MDD, ulcerative colitis who presents via telemedicine for request to increase Lexapro dosage. Patient states she has been taking Lexapro 10 mg for the past couple of years but recently she felt more anxious, sad, and less functional causing her to increase her dosage on her own to 20 mg with extra tablets she had. She reports increased stress recently secondary to her living situation. States she has been taking 20 mg Lexapro for the past 2 months and has been tolerating it well. She denies suicidal or homicidal intent. She follows with a therapist once every 2 weeks. Reports she is sleeping about 6 hours a night which is typical for her.

## 2024-08-30 NOTE — HISTORY OF PRESENT ILLNESS
[Home] : at home, [unfilled] , at the time of the visit. [Medical Office: ([facility])___] : at the medical office located in  [Verbal consent obtained from patient] : the patient, [unfilled] [FreeTextEntry1] : Change in Lexapro Dosage [de-identified] : 28 y/o F PMHx MDD, ulcerative colitis who presents via telemedicine for request to increase Lexapro dosage. Patient states she has been taking Lexapro 10 mg for the past couple of years but recently she felt more anxious, sad, and less functional causing her to increase her dosage on her own to 20 mg with extra tablets she had. She reports increased stress recently secondary to her living situation. States she has been taking 20 mg Lexapro for the past 2 months and has been tolerating it well. She denies suicidal or homicidal intent. She follows with a therapist once every 2 weeks. Reports she is sleeping about 6 hours a night which is typical for her.

## 2024-08-30 NOTE — HISTORY OF PRESENT ILLNESS
[Home] : at home, [unfilled] , at the time of the visit. [Medical Office: ([facility])___] : at the medical office located in  [Verbal consent obtained from patient] : the patient, [unfilled] [FreeTextEntry1] : Change in Lexapro Dosage [de-identified] : 30 y/o F PMHx MDD, ulcerative colitis who presents via telemedicine for request to increase Lexapro dosage. Patient states she has been taking Lexapro 10 mg for the past couple of years but recently she felt more anxious, sad, and less functional causing her to increase her dosage on her own to 20 mg with extra tablets she had. She reports increased stress recently secondary to her living situation. States she has been taking 20 mg Lexapro for the past 2 months and has been tolerating it well. She denies suicidal or homicidal intent. She follows with a therapist once every 2 weeks. Reports she is sleeping about 6 hours a night which is typical for her.

## 2024-08-30 NOTE — HISTORY OF PRESENT ILLNESS
[Home] : at home, [unfilled] , at the time of the visit. [Medical Office: ([facility])___] : at the medical office located in  [Verbal consent obtained from patient] : the patient, [unfilled] [FreeTextEntry1] : Change in Lexapro Dosage [de-identified] : 28 y/o F PMHx MDD, ulcerative colitis who presents via telemedicine for request to increase Lexapro dosage. Patient states she has been taking Lexapro 10 mg for the past couple of years but recently she felt more anxious, sad, and less functional causing her to increase her dosage on her own to 20 mg with extra tablets she had. She reports increased stress recently secondary to her living situation. States she has been taking 20 mg Lexapro for the past 2 months and has been tolerating it well. She denies suicidal or homicidal intent. She follows with a therapist once every 2 weeks. Reports she is sleeping about 6 hours a night which is typical for her.

## 2024-08-30 NOTE — HISTORY OF PRESENT ILLNESS
[Home] : at home, [unfilled] , at the time of the visit. [Medical Office: ([facility])___] : at the medical office located in  [Verbal consent obtained from patient] : the patient, [unfilled] [FreeTextEntry1] : Change in Lexapro Dosage [de-identified] : 28 y/o F PMHx MDD, ulcerative colitis who presents via telemedicine for request to increase Lexapro dosage. Patient states she has been taking Lexapro 10 mg for the past couple of years but recently she felt more anxious, sad, and less functional causing her to increase her dosage on her own to 20 mg with extra tablets she had. She reports increased stress recently secondary to her living situation. States she has been taking 20 mg Lexapro for the past 2 months and has been tolerating it well. She denies suicidal or homicidal intent. She follows with a therapist once every 2 weeks. Reports she is sleeping about 6 hours a night which is typical for her.

## 2024-08-30 NOTE — HISTORY OF PRESENT ILLNESS
[Home] : at home, [unfilled] , at the time of the visit. [Medical Office: ([facility])___] : at the medical office located in  [Verbal consent obtained from patient] : the patient, [unfilled] [FreeTextEntry1] : Change in Lexapro Dosage [de-identified] : 30 y/o F PMHx MDD, ulcerative colitis who presents via telemedicine for request to increase Lexapro dosage. Patient states she has been taking Lexapro 10 mg for the past couple of years but recently she felt more anxious, sad, and less functional causing her to increase her dosage on her own to 20 mg with extra tablets she had. She reports increased stress recently secondary to her living situation. States she has been taking 20 mg Lexapro for the past 2 months and has been tolerating it well. She denies suicidal or homicidal intent. She follows with a therapist once every 2 weeks. Reports she is sleeping about 6 hours a night which is typical for her.

## 2024-08-30 NOTE — PLAN
[FreeTextEntry1] : #MDD: Patient requested increase in lexapro dosage, has been feeling more anxious/sad/unmotivated Plan: Rx sent for escitalopram oxalate 20 mg q24h, 90 day supply 3 month televisit follow up to monitor response to dosage adjustment Continue to follow with therapist once every 2 weeks.

## 2024-10-17 ENCOUNTER — APPOINTMENT (OUTPATIENT)
Dept: INTERNAL MEDICINE | Facility: CLINIC | Age: 29
End: 2024-10-17
Payer: MEDICAID

## 2024-10-17 DIAGNOSIS — F32.9 MAJOR DEPRESSIVE DISORDER, SINGLE EPISODE, UNSPECIFIED: ICD-10-CM

## 2024-10-17 DIAGNOSIS — G47.00 INSOMNIA, UNSPECIFIED: ICD-10-CM

## 2024-10-17 PROCEDURE — 99213 OFFICE O/P EST LOW 20 MIN: CPT | Mod: GC

## 2024-10-17 PROCEDURE — G2211 COMPLEX E/M VISIT ADD ON: CPT | Mod: NC

## 2024-11-19 ENCOUNTER — APPOINTMENT (OUTPATIENT)
Dept: INTERNAL MEDICINE | Facility: CLINIC | Age: 29
End: 2024-11-19
Payer: MEDICAID

## 2024-11-19 VITALS
SYSTOLIC BLOOD PRESSURE: 115 MMHG | HEART RATE: 92 BPM | HEIGHT: 66 IN | TEMPERATURE: 98.2 F | BODY MASS INDEX: 21.86 KG/M2 | WEIGHT: 136 LBS | OXYGEN SATURATION: 97 % | DIASTOLIC BLOOD PRESSURE: 71 MMHG

## 2024-11-19 DIAGNOSIS — F41.9 ANXIETY DISORDER, UNSPECIFIED: ICD-10-CM

## 2024-11-19 DIAGNOSIS — L30.9 DERMATITIS, UNSPECIFIED: ICD-10-CM

## 2024-11-19 DIAGNOSIS — R21 RASH AND OTHER NONSPECIFIC SKIN ERUPTION: ICD-10-CM

## 2024-11-19 DIAGNOSIS — Z12.4 ENCOUNTER FOR SCREENING FOR MALIGNANT NEOPLASM OF CERVIX: ICD-10-CM

## 2024-11-19 DIAGNOSIS — G47.00 INSOMNIA, UNSPECIFIED: ICD-10-CM

## 2024-11-19 DIAGNOSIS — F32.A ANXIETY DISORDER, UNSPECIFIED: ICD-10-CM

## 2024-11-19 DIAGNOSIS — Z11.3 ENCOUNTER FOR SCREENING FOR INFECTIONS WITH A PREDOMINANTLY SEXUAL MODE OF TRANSMISSION: ICD-10-CM

## 2024-11-19 PROCEDURE — 99214 OFFICE O/P EST MOD 30 MIN: CPT | Mod: GC

## 2024-11-19 PROCEDURE — G2211 COMPLEX E/M VISIT ADD ON: CPT | Mod: NC

## 2024-11-19 RX ORDER — MOMETASONE FUROATE 1 MG/G
0.1 CREAM TOPICAL
Qty: 1 | Refills: 1 | Status: ACTIVE | COMMUNITY
Start: 2024-11-19 | End: 1900-01-01

## 2024-11-20 LAB
C TRACH RRNA SPEC QL NAA+PROBE: NOT DETECTED
C TRACH RRNA SPEC QL NAA+PROBE: NOT DETECTED
HCV AB SER QL: NONREACTIVE
HCV S/CO RATIO: 0.13 S/CO
HIV1+2 AB SPEC QL IA.RAPID: NONREACTIVE
N GONORRHOEA RRNA SPEC QL NAA+PROBE: NOT DETECTED
N GONORRHOEA RRNA SPEC QL NAA+PROBE: NOT DETECTED
RPR SER-TITR: NORMAL
SOURCE AMPLIFICATION: NORMAL
SOURCE ORAL: NORMAL

## 2024-11-21 ENCOUNTER — NON-APPOINTMENT (OUTPATIENT)
Age: 29
End: 2024-11-21

## 2025-02-24 ENCOUNTER — RX RENEWAL (OUTPATIENT)
Age: 30
End: 2025-02-24

## 2025-05-22 ENCOUNTER — RX RENEWAL (OUTPATIENT)
Age: 30
End: 2025-05-22

## 2025-08-05 ENCOUNTER — APPOINTMENT (OUTPATIENT)
Dept: INTERNAL MEDICINE | Facility: CLINIC | Age: 30
End: 2025-08-05
Payer: MEDICAID

## 2025-08-05 VITALS
WEIGHT: 140 LBS | HEIGHT: 66 IN | TEMPERATURE: 98.2 F | DIASTOLIC BLOOD PRESSURE: 69 MMHG | BODY MASS INDEX: 22.5 KG/M2 | OXYGEN SATURATION: 76 % | HEART RATE: 88 BPM | SYSTOLIC BLOOD PRESSURE: 103 MMHG

## 2025-08-05 DIAGNOSIS — Z11.3 ENCOUNTER FOR SCREENING FOR INFECTIONS WITH A PREDOMINANTLY SEXUAL MODE OF TRANSMISSION: ICD-10-CM

## 2025-08-05 DIAGNOSIS — F32.A ANXIETY DISORDER, UNSPECIFIED: ICD-10-CM

## 2025-08-05 DIAGNOSIS — F41.9 ANXIETY DISORDER, UNSPECIFIED: ICD-10-CM

## 2025-08-05 DIAGNOSIS — R53.82 CHRONIC FATIGUE, UNSPECIFIED: ICD-10-CM

## 2025-08-05 DIAGNOSIS — K51.90 ULCERATIVE COLITIS, UNSPECIFIED, W/OUT COMPLICATIONS: ICD-10-CM

## 2025-08-05 PROCEDURE — 99395 PREV VISIT EST AGE 18-39: CPT | Mod: 25

## 2025-08-05 PROCEDURE — 99214 OFFICE O/P EST MOD 30 MIN: CPT | Mod: 25,GC

## 2025-08-06 LAB
ALBUMIN SERPL ELPH-MCNC: 4.8 G/DL
ALP BLD-CCNC: 72 U/L
ALT SERPL-CCNC: 19 U/L
ANION GAP SERPL CALC-SCNC: 14 MMOL/L
AST SERPL-CCNC: 19 U/L
BASOPHILS # BLD AUTO: 0.03 K/UL
BASOPHILS NFR BLD AUTO: 0.5 %
BILIRUB SERPL-MCNC: 0.4 MG/DL
BUN SERPL-MCNC: 14 MG/DL
CALCIUM SERPL-MCNC: 9.9 MG/DL
CHLORIDE SERPL-SCNC: 102 MMOL/L
CO2 SERPL-SCNC: 24 MMOL/L
CREAT SERPL-MCNC: 0.82 MG/DL
EGFRCR SERPLBLD CKD-EPI 2021: 98 ML/MIN/1.73M2
EOSINOPHIL # BLD AUTO: 0.1 K/UL
EOSINOPHIL NFR BLD AUTO: 1.7 %
FERRITIN SERPL-MCNC: 20 NG/ML
GLUCOSE SERPL-MCNC: 81 MG/DL
HCT VFR BLD CALC: 42.6 %
HGB BLD-MCNC: 14.2 G/DL
HIV1+2 AB SPEC QL IA.RAPID: NONREACTIVE
IMM GRANULOCYTES NFR BLD AUTO: 0.2 %
IRON SATN MFR SERPL: 24 %
IRON SERPL-MCNC: 74 UG/DL
LYMPHOCYTES # BLD AUTO: 3.29 K/UL
LYMPHOCYTES NFR BLD AUTO: 54.8 %
MAN DIFF?: NORMAL
MCHC RBC-ENTMCNC: 33 PG
MCHC RBC-ENTMCNC: 33.3 G/DL
MCV RBC AUTO: 99.1 FL
MONOCYTES # BLD AUTO: 0.32 K/UL
MONOCYTES NFR BLD AUTO: 5.3 %
NEUTROPHILS # BLD AUTO: 2.25 K/UL
NEUTROPHILS NFR BLD AUTO: 37.5 %
PLATELET # BLD AUTO: 222 K/UL
POTASSIUM SERPL-SCNC: 4.3 MMOL/L
PROT SERPL-MCNC: 7.4 G/DL
RBC # BLD: 4.3 M/UL
RBC # FLD: 12.4 %
SODIUM SERPL-SCNC: 141 MMOL/L
T PALLIDUM AB SER QL IA: NEGATIVE
TIBC SERPL-MCNC: 305 UG/DL
TRANSFERRIN SERPL-MCNC: 251 MG/DL
TSH SERPL-ACNC: 1.2 UIU/ML
UIBC SERPL-MCNC: 231 UG/DL
WBC # FLD AUTO: 6 K/UL

## 2025-08-07 LAB
C TRACH RRNA SPEC QL NAA+PROBE: NOT DETECTED
N GONORRHOEA RRNA SPEC QL NAA+PROBE: NOT DETECTED
SOURCE AMPLIFICATION: NORMAL

## 2025-08-08 LAB
C TRACH RRNA SPEC QL NAA+PROBE: NOT DETECTED
N GONORRHOEA RRNA SPEC QL NAA+PROBE: NOT DETECTED
SOURCE ORAL: NORMAL

## 2025-08-25 ENCOUNTER — RX RENEWAL (OUTPATIENT)
Age: 30
End: 2025-08-25

## 2025-08-27 ENCOUNTER — RX RENEWAL (OUTPATIENT)
Age: 30
End: 2025-08-27

## (undated) DEVICE — FORCEP RADIAL JAW 4 W NDL 2.2MM 2.8MM 240CM ORANGE DISP